# Patient Record
Sex: MALE | URBAN - NONMETROPOLITAN AREA
[De-identification: names, ages, dates, MRNs, and addresses within clinical notes are randomized per-mention and may not be internally consistent; named-entity substitution may affect disease eponyms.]

---

## 2018-12-11 ENCOUNTER — APPOINTMENT (RX ONLY)
Dept: URBAN - NONMETROPOLITAN AREA CLINIC 1 | Facility: CLINIC | Age: 68
Setting detail: DERMATOLOGY
End: 2018-12-11

## 2018-12-11 ENCOUNTER — RX ONLY (OUTPATIENT)
Age: 68
Setting detail: RX ONLY
End: 2018-12-11

## 2018-12-11 DIAGNOSIS — L57.8 OTHER SKIN CHANGES DUE TO CHRONIC EXPOSURE TO NONIONIZING RADIATION: ICD-10-CM

## 2018-12-11 DIAGNOSIS — S90.1 CONTUSION OF TOE WITHOUT DAMAGE TO NAIL: ICD-10-CM

## 2018-12-11 DIAGNOSIS — L91.8 OTHER HYPERTROPHIC DISORDERS OF THE SKIN: ICD-10-CM

## 2018-12-11 DIAGNOSIS — D22 MELANOCYTIC NEVI: ICD-10-CM

## 2018-12-11 PROBLEM — L57.0 ACTINIC KERATOSIS: Status: ACTIVE | Noted: 2018-12-11

## 2018-12-11 PROBLEM — L20.84 INTRINSIC (ALLERGIC) ECZEMA: Status: ACTIVE | Noted: 2018-12-11

## 2018-12-11 PROBLEM — D22.9 MELANOCYTIC NEVI, UNSPECIFIED: Status: ACTIVE | Noted: 2018-12-11

## 2018-12-11 PROBLEM — S90.121A CONTUSION OF RIGHT LESSER TOE(S) WITHOUT DAMAGE TO NAIL, INITIAL ENCOUNTER: Status: ACTIVE | Noted: 2018-12-11

## 2018-12-11 PROCEDURE — 99213 OFFICE O/P EST LOW 20 MIN: CPT

## 2018-12-11 PROCEDURE — ? COUNSELING

## 2018-12-11 RX ORDER — FLUOROURACIL 2 G/40G
CREAM TOPICAL
Qty: 1 | Refills: 2 | Status: ERX | COMMUNITY
Start: 2018-12-11

## 2018-12-11 ASSESSMENT — LOCATION ZONE DERM
LOCATION ZONE: FACE
LOCATION ZONE: TOENAIL
LOCATION ZONE: FACE

## 2018-12-11 ASSESSMENT — LOCATION SIMPLE DESCRIPTION DERM
LOCATION SIMPLE: RIGHT FOREHEAD
LOCATION SIMPLE: RIGHT 2ND TOE
LOCATION SIMPLE: RIGHT CHEEK

## 2018-12-11 ASSESSMENT — LOCATION DETAILED DESCRIPTION DERM
LOCATION DETAILED: RIGHT 2ND TOENAIL
LOCATION DETAILED: RIGHT SUPERIOR LATERAL MALAR CHEEK
LOCATION DETAILED: RIGHT INFERIOR MEDIAL FOREHEAD

## 2021-01-06 DIAGNOSIS — Z23 NEED FOR VACCINATION: ICD-10-CM

## 2021-09-07 ENCOUNTER — APPOINTMENT (RX ONLY)
Dept: URBAN - NONMETROPOLITAN AREA CLINIC 1 | Facility: CLINIC | Age: 71
Setting detail: DERMATOLOGY
End: 2021-09-07

## 2021-09-07 DIAGNOSIS — D485 NEOPLASM OF UNCERTAIN BEHAVIOR OF SKIN: ICD-10-CM

## 2021-09-07 DIAGNOSIS — L81.4 OTHER MELANIN HYPERPIGMENTATION: ICD-10-CM

## 2021-09-07 DIAGNOSIS — L82.0 INFLAMED SEBORRHEIC KERATOSIS: ICD-10-CM

## 2021-09-07 PROBLEM — D48.5 NEOPLASM OF UNCERTAIN BEHAVIOR OF SKIN: Status: ACTIVE | Noted: 2021-09-07

## 2021-09-07 PROCEDURE — 11103 TANGNTL BX SKIN EA SEP/ADDL: CPT

## 2021-09-07 PROCEDURE — ? PRESCRIPTION

## 2021-09-07 PROCEDURE — 99212 OFFICE O/P EST SF 10 MIN: CPT | Mod: 25

## 2021-09-07 PROCEDURE — ? COUNSELING

## 2021-09-07 PROCEDURE — 11102 TANGNTL BX SKIN SINGLE LES: CPT

## 2021-09-07 PROCEDURE — ? BIOPSY BY SHAVE METHOD

## 2021-09-07 RX ORDER — HYDROQUINONE 4 %
1 CREAM (GRAM) TOPICAL BID
Qty: 1 | Refills: 3 | Status: ERX | COMMUNITY
Start: 2021-09-07

## 2021-09-07 RX ADMIN — Medication 1: at 00:00

## 2021-09-07 ASSESSMENT — LOCATION DETAILED DESCRIPTION DERM
LOCATION DETAILED: RIGHT INFERIOR LATERAL MIDBACK
LOCATION DETAILED: RIGHT LATERAL SUPERIOR CHEST
LOCATION DETAILED: RIGHT CENTRAL MALAR CHEEK
LOCATION DETAILED: LEFT CENTRAL MALAR CHEEK

## 2021-09-07 ASSESSMENT — LOCATION SIMPLE DESCRIPTION DERM
LOCATION SIMPLE: RIGHT CHEEK
LOCATION SIMPLE: CHEST
LOCATION SIMPLE: RIGHT LOWER BACK
LOCATION SIMPLE: LEFT CHEEK

## 2021-09-07 ASSESSMENT — LOCATION ZONE DERM
LOCATION ZONE: FACE
LOCATION ZONE: TRUNK

## 2021-09-07 NOTE — PROCEDURE: BIOPSY BY SHAVE METHOD
Detail Level: Detailed
Depth Of Biopsy: dermis
Was A Bandage Applied: Yes
Size Of Lesion In Cm: 0.7
X Size Of Lesion In Cm: 0.8
Biopsy Type: H and E
Biopsy Method: Dermablade
Anesthesia Type: 1% lidocaine with epinephrine and a 1:10 solution of 8.4% sodium bicarbonate
Anesthesia Volume In Cc: 1
Additional Anesthesia Volume In Cc (Will Not Render If 0): 0
Hemostasis: Drysol
Wound Care: Vaseline
Dressing: Band-Aid
Destruction After The Procedure: No
Type Of Destruction Used: Electrodesiccation and Curettage
Curettage Text: The wound bed was treated with curettage after the biopsy was done.
Cryotherapy Text: The wound bed was treated with cryotherapy after the biopsy was performed.
Electrodesiccation Text: The wound bed was treated with electrodesiccation after the biopsy was performed.
Electrodesiccation And Curettage Text: The wound bed was treated with electrodesiccation and curettage after the biopsy was performed.
Silver Nitrate Text: The wound bed was treated with silver nitrate after the biopsy was performed.
Lab: [id number]
Lab Facility: [id number]
Consent: Written consent was obtained and risks were reviewed including but not limited to scarring, infection, bleeding, scabbing, incomplete removal, nerve damage and allergy to anesthesia.
Post-Care Instructions: I reviewed with the patient in detail post-care instructions. Patient is to keep the biopsy site dry overnight, and then apply Polysporin twice daily until healed. Patient may apply hydrogen peroxide soaks to remove any crusting.
Notification Instructions: Patient will be notified of biopsy results. However, patient instructed to call the office if not contacted within 2 weeks.
Billing Type: Third-Party Bill
Information: Selecting Yes will display possible errors in your note based on the variables you have selected. This validation is only offered as a suggestion for you. PLEASE NOTE THAT THE VALIDATION TEXT WILL BE REMOVED WHEN YOU FINALIZE YOUR NOTE. IF YOU WANT TO FAX A PRELIMINARY NOTE YOU WILL NEED TO TOGGLE THIS TO 'NO' IF YOU DO NOT WANT IT IN YOUR FAXED NOTE.
Size Of Lesion In Cm: 0.3

## 2022-02-12 ENCOUNTER — TELEPHONE (OUTPATIENT)
Dept: CARDIOLOGY | Facility: MEDICAL CENTER | Age: 72
End: 2022-02-12

## 2022-02-12 ENCOUNTER — HOSPITAL ENCOUNTER (INPATIENT)
Facility: MEDICAL CENTER | Age: 72
LOS: 2 days | DRG: 247 | End: 2022-02-14
Attending: EMERGENCY MEDICINE | Admitting: INTERNAL MEDICINE
Payer: MEDICARE

## 2022-02-12 ENCOUNTER — APPOINTMENT (OUTPATIENT)
Dept: RADIOLOGY | Facility: MEDICAL CENTER | Age: 72
DRG: 247 | End: 2022-02-12
Attending: EMERGENCY MEDICINE
Payer: MEDICARE

## 2022-02-12 DIAGNOSIS — I21.4 NSTEMI (NON-ST ELEVATED MYOCARDIAL INFARCTION) (HCC): ICD-10-CM

## 2022-02-12 PROBLEM — E53.8 B12 DEFICIENCY: Status: ACTIVE | Noted: 2022-02-12

## 2022-02-12 PROBLEM — N40.0 BPH (BENIGN PROSTATIC HYPERPLASIA): Status: ACTIVE | Noted: 2022-02-12

## 2022-02-12 LAB
ALBUMIN SERPL BCP-MCNC: 4.7 G/DL (ref 3.2–4.9)
ALBUMIN/GLOB SERPL: 1.7 G/DL
ALP SERPL-CCNC: 98 U/L (ref 30–99)
ALT SERPL-CCNC: 16 U/L (ref 2–50)
ANION GAP SERPL CALC-SCNC: 13 MMOL/L (ref 7–16)
AST SERPL-CCNC: 25 U/L (ref 12–45)
BASOPHILS # BLD AUTO: 0.4 % (ref 0–1.8)
BASOPHILS # BLD: 0.02 K/UL (ref 0–0.12)
BILIRUB SERPL-MCNC: 0.5 MG/DL (ref 0.1–1.5)
BUN SERPL-MCNC: 18 MG/DL (ref 8–22)
CALCIUM SERPL-MCNC: 9.6 MG/DL (ref 8.5–10.5)
CHLORIDE SERPL-SCNC: 106 MMOL/L (ref 96–112)
CHOLEST SERPL-MCNC: 149 MG/DL (ref 100–199)
CO2 SERPL-SCNC: 23 MMOL/L (ref 20–33)
CREAT SERPL-MCNC: 0.96 MG/DL (ref 0.5–1.4)
EKG IMPRESSION: NORMAL
EOSINOPHIL # BLD AUTO: 0.03 K/UL (ref 0–0.51)
EOSINOPHIL NFR BLD: 0.6 % (ref 0–6.9)
ERYTHROCYTE [DISTWIDTH] IN BLOOD BY AUTOMATED COUNT: 45.3 FL (ref 35.9–50)
EST. AVERAGE GLUCOSE BLD GHB EST-MCNC: 120 MG/DL
GLOBULIN SER CALC-MCNC: 2.7 G/DL (ref 1.9–3.5)
GLUCOSE SERPL-MCNC: 101 MG/DL (ref 65–99)
HBA1C MFR BLD: 5.8 % (ref 4–5.6)
HCT VFR BLD AUTO: 44.9 % (ref 42–52)
HDLC SERPL-MCNC: 46 MG/DL
HGB BLD-MCNC: 15.2 G/DL (ref 14–18)
IMM GRANULOCYTES # BLD AUTO: 0.01 K/UL (ref 0–0.11)
IMM GRANULOCYTES NFR BLD AUTO: 0.2 % (ref 0–0.9)
LDLC SERPL CALC-MCNC: 86 MG/DL
LYMPHOCYTES # BLD AUTO: 1.26 K/UL (ref 1–4.8)
LYMPHOCYTES NFR BLD: 24.6 % (ref 22–41)
MCH RBC QN AUTO: 31.5 PG (ref 27–33)
MCHC RBC AUTO-ENTMCNC: 33.9 G/DL (ref 33.7–35.3)
MCV RBC AUTO: 93 FL (ref 81.4–97.8)
MONOCYTES # BLD AUTO: 0.48 K/UL (ref 0–0.85)
MONOCYTES NFR BLD AUTO: 9.4 % (ref 0–13.4)
NEUTROPHILS # BLD AUTO: 3.32 K/UL (ref 1.82–7.42)
NEUTROPHILS NFR BLD: 64.8 % (ref 44–72)
NRBC # BLD AUTO: 0 K/UL
NRBC BLD-RTO: 0 /100 WBC
PLATELET # BLD AUTO: 201 K/UL (ref 164–446)
PMV BLD AUTO: 9.4 FL (ref 9–12.9)
POTASSIUM SERPL-SCNC: 4.3 MMOL/L (ref 3.6–5.5)
PROT SERPL-MCNC: 7.4 G/DL (ref 6–8.2)
RBC # BLD AUTO: 4.83 M/UL (ref 4.7–6.1)
SODIUM SERPL-SCNC: 142 MMOL/L (ref 135–145)
TRIGL SERPL-MCNC: 86 MG/DL (ref 0–149)
TROPONIN T SERPL-MCNC: 64 NG/L (ref 6–19)
TROPONIN T SERPL-MCNC: 72 NG/L (ref 6–19)
WBC # BLD AUTO: 5.1 K/UL (ref 4.8–10.8)

## 2022-02-12 PROCEDURE — 99223 1ST HOSP IP/OBS HIGH 75: CPT | Performed by: INTERNAL MEDICINE

## 2022-02-12 PROCEDURE — 83036 HEMOGLOBIN GLYCOSYLATED A1C: CPT

## 2022-02-12 PROCEDURE — A9270 NON-COVERED ITEM OR SERVICE: HCPCS | Performed by: STUDENT IN AN ORGANIZED HEALTH CARE EDUCATION/TRAINING PROGRAM

## 2022-02-12 PROCEDURE — 85025 COMPLETE CBC W/AUTO DIFF WBC: CPT

## 2022-02-12 PROCEDURE — 96372 THER/PROPH/DIAG INJ SC/IM: CPT

## 2022-02-12 PROCEDURE — 700111 HCHG RX REV CODE 636 W/ 250 OVERRIDE (IP): Performed by: STUDENT IN AN ORGANIZED HEALTH CARE EDUCATION/TRAINING PROGRAM

## 2022-02-12 PROCEDURE — 80061 LIPID PANEL: CPT

## 2022-02-12 PROCEDURE — 93005 ELECTROCARDIOGRAM TRACING: CPT | Performed by: EMERGENCY MEDICINE

## 2022-02-12 PROCEDURE — 770020 HCHG ROOM/CARE - TELE (206)

## 2022-02-12 PROCEDURE — 84484 ASSAY OF TROPONIN QUANT: CPT

## 2022-02-12 PROCEDURE — 93005 ELECTROCARDIOGRAM TRACING: CPT

## 2022-02-12 PROCEDURE — 71045 X-RAY EXAM CHEST 1 VIEW: CPT

## 2022-02-12 PROCEDURE — 700102 HCHG RX REV CODE 250 W/ 637 OVERRIDE(OP): Performed by: STUDENT IN AN ORGANIZED HEALTH CARE EDUCATION/TRAINING PROGRAM

## 2022-02-12 PROCEDURE — 99223 1ST HOSP IP/OBS HIGH 75: CPT | Mod: AI | Performed by: STUDENT IN AN ORGANIZED HEALTH CARE EDUCATION/TRAINING PROGRAM

## 2022-02-12 PROCEDURE — 99291 CRITICAL CARE FIRST HOUR: CPT

## 2022-02-12 PROCEDURE — 80053 COMPREHEN METABOLIC PANEL: CPT

## 2022-02-12 RX ORDER — TAMSULOSIN HYDROCHLORIDE 0.4 MG/1
0.4 CAPSULE ORAL DAILY
Status: DISCONTINUED | OUTPATIENT
Start: 2022-02-12 | End: 2022-02-14 | Stop reason: HOSPADM

## 2022-02-12 RX ORDER — TAMSULOSIN HYDROCHLORIDE 0.4 MG/1
0.4 CAPSULE ORAL DAILY
COMMUNITY

## 2022-02-12 RX ORDER — CHOLECALCIFEROL (VITAMIN D3) 125 MCG
1000 CAPSULE ORAL 2 TIMES DAILY
Status: DISCONTINUED | OUTPATIENT
Start: 2022-02-12 | End: 2022-02-14 | Stop reason: HOSPADM

## 2022-02-12 RX ORDER — HEPARIN SODIUM 5000 [USP'U]/ML
5000 INJECTION, SOLUTION INTRAVENOUS; SUBCUTANEOUS EVERY 8 HOURS
Status: DISCONTINUED | OUTPATIENT
Start: 2022-02-12 | End: 2022-02-14 | Stop reason: HOSPADM

## 2022-02-12 RX ORDER — LANOLIN ALCOHOL/MO/W.PET/CERES
1000 CREAM (GRAM) TOPICAL 2 TIMES DAILY
COMMUNITY

## 2022-02-12 RX ADMIN — HEPARIN SODIUM 5000 UNITS: 5000 INJECTION, SOLUTION INTRAVENOUS; SUBCUTANEOUS at 21:49

## 2022-02-12 RX ADMIN — TAMSULOSIN HYDROCHLORIDE 0.4 MG: 0.4 CAPSULE ORAL at 21:49

## 2022-02-12 RX ADMIN — CYANOCOBALAMIN TAB 500 MCG 1000 MCG: 500 TAB at 21:49

## 2022-02-12 ASSESSMENT — ENCOUNTER SYMPTOMS
PALPITATIONS: 0
EYE PAIN: 0
CHILLS: 0
EYE DISCHARGE: 0
FEVER: 0
NERVOUS/ANXIOUS: 0
VOMITING: 0
MYALGIAS: 0
NAUSEA: 0
PSYCHIATRIC NEGATIVE: 1
MUSCULOSKELETAL NEGATIVE: 1
NAUSEA: 1
RESPIRATORY NEGATIVE: 1
EYES NEGATIVE: 1
BRUISES/BLEEDS EASILY: 0
ABDOMINAL PAIN: 0
WHEEZING: 0
NEUROLOGICAL NEGATIVE: 1
COUGH: 0

## 2022-02-12 ASSESSMENT — LIFESTYLE VARIABLES: DO YOU DRINK ALCOHOL: NO

## 2022-02-13 ENCOUNTER — APPOINTMENT (OUTPATIENT)
Dept: CARDIOLOGY | Facility: MEDICAL CENTER | Age: 72
DRG: 247 | End: 2022-02-13
Attending: STUDENT IN AN ORGANIZED HEALTH CARE EDUCATION/TRAINING PROGRAM
Payer: MEDICARE

## 2022-02-13 ENCOUNTER — APPOINTMENT (OUTPATIENT)
Dept: CARDIOLOGY | Facility: MEDICAL CENTER | Age: 72
DRG: 247 | End: 2022-02-13
Attending: INTERNAL MEDICINE
Payer: MEDICARE

## 2022-02-13 LAB
ACT BLD: 231 SEC (ref 74–137)
ACT BLD: 386 SEC (ref 74–137)
LV EJECT FRACT  99904: 70
LV EJECT FRACT MOD 2C 99903: 78.15
LV EJECT FRACT MOD 4C 99902: 68.65
LV EJECT FRACT MOD BP 99901: 73.95
TROPONIN T SERPL-MCNC: 52 NG/L (ref 6–19)

## 2022-02-13 PROCEDURE — 99153 MOD SED SAME PHYS/QHP EA: CPT

## 2022-02-13 PROCEDURE — 93005 ELECTROCARDIOGRAM TRACING: CPT | Performed by: INTERNAL MEDICINE

## 2022-02-13 PROCEDURE — B240ZZ3 ULTRASONOGRAPHY OF SINGLE CORONARY ARTERY, INTRAVASCULAR: ICD-10-PCS | Performed by: INTERNAL MEDICINE

## 2022-02-13 PROCEDURE — 700111 HCHG RX REV CODE 636 W/ 250 OVERRIDE (IP)

## 2022-02-13 PROCEDURE — 93571 IV DOP VEL&/PRESS C FLO 1ST: CPT | Mod: 26,52,LD | Performed by: INTERNAL MEDICINE

## 2022-02-13 PROCEDURE — 700102 HCHG RX REV CODE 250 W/ 637 OVERRIDE(OP): Performed by: STUDENT IN AN ORGANIZED HEALTH CARE EDUCATION/TRAINING PROGRAM

## 2022-02-13 PROCEDURE — 160002 HCHG RECOVERY MINUTES (STAT)

## 2022-02-13 PROCEDURE — 700102 HCHG RX REV CODE 250 W/ 637 OVERRIDE(OP): Performed by: INTERNAL MEDICINE

## 2022-02-13 PROCEDURE — 4A023N7 MEASUREMENT OF CARDIAC SAMPLING AND PRESSURE, LEFT HEART, PERCUTANEOUS APPROACH: ICD-10-PCS | Performed by: INTERNAL MEDICINE

## 2022-02-13 PROCEDURE — 160035 HCHG PACU - 1ST 60 MINS PHASE I

## 2022-02-13 PROCEDURE — B2111ZZ FLUOROSCOPY OF MULTIPLE CORONARY ARTERIES USING LOW OSMOLAR CONTRAST: ICD-10-PCS | Performed by: INTERNAL MEDICINE

## 2022-02-13 PROCEDURE — 160036 HCHG PACU - EA ADDL 30 MINS PHASE I

## 2022-02-13 PROCEDURE — 700101 HCHG RX REV CODE 250

## 2022-02-13 PROCEDURE — 99152 MOD SED SAME PHYS/QHP 5/>YRS: CPT | Performed by: INTERNAL MEDICINE

## 2022-02-13 PROCEDURE — A9270 NON-COVERED ITEM OR SERVICE: HCPCS | Performed by: INTERNAL MEDICINE

## 2022-02-13 PROCEDURE — 700111 HCHG RX REV CODE 636 W/ 250 OVERRIDE (IP): Performed by: STUDENT IN AN ORGANIZED HEALTH CARE EDUCATION/TRAINING PROGRAM

## 2022-02-13 PROCEDURE — 027034Z DILATION OF CORONARY ARTERY, ONE ARTERY WITH DRUG-ELUTING INTRALUMINAL DEVICE, PERCUTANEOUS APPROACH: ICD-10-PCS | Performed by: INTERNAL MEDICINE

## 2022-02-13 PROCEDURE — 85347 COAGULATION TIME ACTIVATED: CPT | Mod: 91

## 2022-02-13 PROCEDURE — A9270 NON-COVERED ITEM OR SERVICE: HCPCS | Performed by: STUDENT IN AN ORGANIZED HEALTH CARE EDUCATION/TRAINING PROGRAM

## 2022-02-13 PROCEDURE — 36415 COLL VENOUS BLD VENIPUNCTURE: CPT

## 2022-02-13 PROCEDURE — 84484 ASSAY OF TROPONIN QUANT: CPT

## 2022-02-13 PROCEDURE — 96372 THER/PROPH/DIAG INJ SC/IM: CPT

## 2022-02-13 PROCEDURE — 92978 ENDOLUMINL IVUS OCT C 1ST: CPT | Mod: 26,LD | Performed by: INTERNAL MEDICINE

## 2022-02-13 PROCEDURE — 92928 PRQ TCAT PLMT NTRAC ST 1 LES: CPT | Mod: LD | Performed by: INTERNAL MEDICINE

## 2022-02-13 PROCEDURE — 770020 HCHG ROOM/CARE - TELE (206)

## 2022-02-13 PROCEDURE — 700102 HCHG RX REV CODE 250 W/ 637 OVERRIDE(OP)

## 2022-02-13 PROCEDURE — 93306 TTE W/DOPPLER COMPLETE: CPT | Mod: 26 | Performed by: INTERNAL MEDICINE

## 2022-02-13 PROCEDURE — A9270 NON-COVERED ITEM OR SERVICE: HCPCS

## 2022-02-13 PROCEDURE — 93458 L HRT ARTERY/VENTRICLE ANGIO: CPT | Mod: 26,59 | Performed by: INTERNAL MEDICINE

## 2022-02-13 PROCEDURE — 93306 TTE W/DOPPLER COMPLETE: CPT

## 2022-02-13 PROCEDURE — 99232 SBSQ HOSP IP/OBS MODERATE 35: CPT | Performed by: INTERNAL MEDICINE

## 2022-02-13 RX ORDER — PRASUGREL 10 MG/1
TABLET, FILM COATED ORAL
Status: DISPENSED
Start: 2022-02-13 | End: 2022-02-13

## 2022-02-13 RX ORDER — PRASUGREL 10 MG/1
10 TABLET, FILM COATED ORAL DAILY
Status: DISCONTINUED | OUTPATIENT
Start: 2022-02-14 | End: 2022-02-14

## 2022-02-13 RX ORDER — ROSUVASTATIN CALCIUM 10 MG/1
10 TABLET, COATED ORAL EVERY EVENING
Status: DISCONTINUED | OUTPATIENT
Start: 2022-02-13 | End: 2022-02-14

## 2022-02-13 RX ORDER — HEPARIN SODIUM 200 [USP'U]/100ML
INJECTION, SOLUTION INTRAVENOUS
Status: COMPLETED
Start: 2022-02-13 | End: 2022-02-13

## 2022-02-13 RX ORDER — MIDAZOLAM HYDROCHLORIDE 1 MG/ML
INJECTION INTRAMUSCULAR; INTRAVENOUS
Status: COMPLETED
Start: 2022-02-13 | End: 2022-02-13

## 2022-02-13 RX ORDER — SODIUM CHLORIDE 9 MG/ML
INJECTION, SOLUTION INTRAVENOUS CONTINUOUS
Status: DISCONTINUED | OUTPATIENT
Start: 2022-02-13 | End: 2022-02-13

## 2022-02-13 RX ORDER — LIDOCAINE HYDROCHLORIDE 20 MG/ML
INJECTION, SOLUTION INFILTRATION; PERINEURAL
Status: COMPLETED
Start: 2022-02-13 | End: 2022-02-13

## 2022-02-13 RX ORDER — PRASUGREL 10 MG/1
60 TABLET, FILM COATED ORAL ONCE
Status: DISCONTINUED | OUTPATIENT
Start: 2022-02-13 | End: 2022-02-13

## 2022-02-13 RX ORDER — VERAPAMIL HYDROCHLORIDE 2.5 MG/ML
INJECTION, SOLUTION INTRAVENOUS
Status: COMPLETED
Start: 2022-02-13 | End: 2022-02-13

## 2022-02-13 RX ORDER — METOPROLOL SUCCINATE 25 MG/1
25 TABLET, EXTENDED RELEASE ORAL
Status: DISCONTINUED | OUTPATIENT
Start: 2022-02-14 | End: 2022-02-14

## 2022-02-13 RX ORDER — HEPARIN SODIUM 1000 [USP'U]/ML
INJECTION, SOLUTION INTRAVENOUS; SUBCUTANEOUS
Status: COMPLETED
Start: 2022-02-13 | End: 2022-02-13

## 2022-02-13 RX ADMIN — VERAPAMIL HYDROCHLORIDE 5 MG: 2.5 INJECTION, SOLUTION INTRAVENOUS at 09:43

## 2022-02-13 RX ADMIN — FENTANYL CITRATE 50 MCG: 50 INJECTION INTRAMUSCULAR; INTRAVENOUS at 10:11

## 2022-02-13 RX ADMIN — HEPARIN SODIUM 2000 UNITS: 200 INJECTION, SOLUTION INTRAVENOUS at 09:43

## 2022-02-13 RX ADMIN — LIDOCAINE HYDROCHLORIDE: 20 INJECTION, SOLUTION INFILTRATION; PERINEURAL at 09:43

## 2022-02-13 RX ADMIN — MIDAZOLAM HYDROCHLORIDE 1 MG: 1 INJECTION, SOLUTION INTRAMUSCULAR; INTRAVENOUS at 10:11

## 2022-02-13 RX ADMIN — HEPARIN SODIUM 5000 UNITS: 5000 INJECTION, SOLUTION INTRAVENOUS; SUBCUTANEOUS at 21:36

## 2022-02-13 RX ADMIN — HEPARIN SODIUM 5000 UNITS: 5000 INJECTION, SOLUTION INTRAVENOUS; SUBCUTANEOUS at 06:24

## 2022-02-13 RX ADMIN — HEPARIN SODIUM: 1000 INJECTION, SOLUTION INTRAVENOUS; SUBCUTANEOUS at 09:43

## 2022-02-13 RX ADMIN — CYANOCOBALAMIN TAB 500 MCG 1000 MCG: 500 TAB at 17:34

## 2022-02-13 RX ADMIN — TAMSULOSIN HYDROCHLORIDE 0.4 MG: 0.4 CAPSULE ORAL at 17:34

## 2022-02-13 RX ADMIN — ROSUVASTATIN 10 MG: 10 TABLET, FILM COATED ORAL at 17:33

## 2022-02-13 RX ADMIN — ASPIRIN 81 MG: 81 TABLET, COATED ORAL at 06:24

## 2022-02-13 RX ADMIN — NITROGLYCERIN 10 ML: 20 INJECTION INTRAVENOUS at 09:43

## 2022-02-13 ASSESSMENT — COGNITIVE AND FUNCTIONAL STATUS - GENERAL
MOBILITY SCORE: 24
DAILY ACTIVITIY SCORE: 24
SUGGESTED CMS G CODE MODIFIER MOBILITY: CH
SUGGESTED CMS G CODE MODIFIER DAILY ACTIVITY: CH

## 2022-02-13 ASSESSMENT — ENCOUNTER SYMPTOMS
DIZZINESS: 0
CONSTIPATION: 0
FEVER: 0
VOMITING: 0
DIARRHEA: 0
SORE THROAT: 0
COUGH: 0
BACK PAIN: 0
PALPITATIONS: 0
CHILLS: 0
NAUSEA: 0
NERVOUS/ANXIOUS: 0
ABDOMINAL PAIN: 0
HEADACHES: 0
SHORTNESS OF BREATH: 0

## 2022-02-13 ASSESSMENT — LIFESTYLE VARIABLES
CONSUMPTION TOTAL: INCOMPLETE
TOTAL SCORE: 0
ALCOHOL_USE: YES
HAVE YOU EVER FELT YOU SHOULD CUT DOWN ON YOUR DRINKING: NO
DOES PATIENT WANT TO STOP DRINKING: NO
TOTAL SCORE: 0
EVER HAD A DRINK FIRST THING IN THE MORNING TO STEADY YOUR NERVES TO GET RID OF A HANGOVER: NO
EVER FELT BAD OR GUILTY ABOUT YOUR DRINKING: NO
HAVE PEOPLE ANNOYED YOU BY CRITICIZING YOUR DRINKING: NO
TOTAL SCORE: 0

## 2022-02-13 ASSESSMENT — PATIENT HEALTH QUESTIONNAIRE - PHQ9
1. LITTLE INTEREST OR PLEASURE IN DOING THINGS: NOT AT ALL
SUM OF ALL RESPONSES TO PHQ9 QUESTIONS 1 AND 2: 0
2. FEELING DOWN, DEPRESSED, IRRITABLE, OR HOPELESS: NOT AT ALL

## 2022-02-13 ASSESSMENT — PAIN DESCRIPTION - PAIN TYPE
TYPE: ACUTE PAIN
TYPE: ACUTE PAIN

## 2022-02-13 NOTE — OR NURSING
1026: [facility] Awake. TR band right radial. No swelling, hematoma or bleeding noted. 2 plus radial pulse. Denies pain or shortness of breath    1100: taking fluids well.    1120: 12 Lead EKG completed.    1130: 2 ml air removed from TR band. No hematoma, swelling or bleeding noted. 2 plus radial pulse.    1145: 2 ml air removed from TR band. No hematoma, swelling or bleeding noted. 2 plus radial pulse.    1200: 3 ml air removed from TR band. No hematoma, swelling or bleeding noted. 2 plus radial pulse.    1215:3 ml air removed from TR band. No hematoma, swelling or bleeding noted. 2 plus radial pulse. Patient eating Cardiac diet for lunch.tolerating well.    1230: slight hematoma noted close to the TR band. Pressure applied.    1250: 3 ml air injected back to TR band.    1330: 3 ml air removed from TR band. No swelling or bleeding noted. 2 plus radial pulse.    1345: 3 ml air removed from TR band. No swelling or bleeding noted. 2 plus radial pulse.    1350: TR band removed. No bleeding or swelling noted. right wrist slightly bruised. Gauze and tegaderm dressing applied over the access site. Patient seating at the side of the [place] voiding.    1430: awake. Denies any distress or discomfort.    1445: report called to Nori PATEL.    1449: transported via [facility] to T 826 by this RN. Stable. Patient on cardiac and pulse ox monitor. Patient wearing face mask. Bedside report given to Riaz PATEL.

## 2022-02-13 NOTE — CONSULTS
Cardiology Initial Consultation    Date of Service  2/12/2022    Referring Physician  Favio Ramirez M.D.    Reason for Consultation  Acute coronary syndrome    History of Presenting Illness  John Timothy D R Lombard is a 71 y.o. male with a past medical history of hyperlipidemia who presented 2/12/2022 with chest discomfort while skate skiing today which  lasted a few minutes.  Some diaphoresis and nausea.  No further episodes.  Did have an episode approximately a week ago.  In quite good shape.  Bikes and skis.  Does not smoke minimal alcohol use.  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  On BPH meds.  Intolerant to statins and Zetia.  Did take aspirin today.  Normal echocardiogram in the past.  Previous CT angiogram with an LAD plaque but low coronary calcium score.    Review of Systems  Review of Systems   Constitutional: Negative for chills and fever.   HENT: Negative for congestion.    Eyes: Negative for pain and discharge.   Respiratory: Negative for cough and wheezing.    Cardiovascular: Negative for chest pain and palpitations.   Gastrointestinal: Negative for abdominal pain, nausea and vomiting.   Musculoskeletal: Negative for myalgias.   Skin: Negative for rash.   Hematological: Does not bruise/bleed easily.   Psychiatric/Behavioral: The patient is not nervous/anxious.    All other systems reviewed and are negative.      Past Medical History   has no past medical history on file.    Surgical History   has no past surgical history on file.    Family History  family history is not on file.    Social History   reports that he quit smoking about 42 years ago. He has never used smokeless tobacco. He reports current alcohol use. He reports that he does not use drugs.    Medications  None       Allergies  Allergies   Allergen Reactions   • Repatha [Evolocumab] Swelling     Lip swelling       Vital signs in last 24 hours  Temp:  [36.6 °C (97.9 °F)] 36.6 °C (97.9 °F)  Pulse:  [66-71] 66  Resp:  [14-16] 14  BP:  (117-127)/(70-75) 117/75  SpO2:  [97 %-98 %] 98 %    Physical Exam  Physical Exam  Vitals reviewed.   Constitutional:       General: He is not in acute distress.     Appearance: He is well-developed. He is not diaphoretic.   Eyes:      Conjunctiva/sclera: Conjunctivae normal.   Neck:      Thyroid: No thyroid mass or thyromegaly.      Vascular: No JVD.      Trachea: No tracheal deviation.   Cardiovascular:      Rate and Rhythm: Normal rate and regular rhythm.      Heart sounds: No murmur heard.      Pulmonary:      Effort: Pulmonary effort is normal. No respiratory distress.      Breath sounds: Normal breath sounds.   Chest:      Chest wall: No tenderness.   Abdominal:      Palpations: Abdomen is soft.      Tenderness: There is no abdominal tenderness.   Musculoskeletal:         General: Normal range of motion.   Skin:     General: Skin is warm and dry.   Neurological:      Mental Status: He is alert and oriented to person, place, and time.      Motor: No tremor.   Psychiatric:         Behavior: Behavior normal.         Lab Review  Lab Results   Component Value Date/Time    WBC 5.1 02/12/2022 06:51 PM    RBC 4.83 02/12/2022 06:51 PM    HEMOGLOBIN 15.2 02/12/2022 06:51 PM    HEMATOCRIT 44.9 02/12/2022 06:51 PM    MCV 93.0 02/12/2022 06:51 PM    MCH 31.5 02/12/2022 06:51 PM    MCHC 33.9 02/12/2022 06:51 PM    MPV 9.4 02/12/2022 06:51 PM      Lab Results   Component Value Date/Time    SODIUM 142 02/12/2022 06:51 PM    POTASSIUM 4.3 02/12/2022 06:51 PM    CHLORIDE 106 02/12/2022 06:51 PM    CO2 23 02/12/2022 06:51 PM    GLUCOSE 101 (H) 02/12/2022 06:51 PM    BUN 18 02/12/2022 06:51 PM    CREATININE 0.96 02/12/2022 06:51 PM      Lab Results   Component Value Date/Time    ASTSGOT 25 02/12/2022 06:51 PM    ALTSGPT 16 02/12/2022 06:51 PM     Lab Results   Component Value Date/Time    CHOLSTRLTOT 192 06/21/2011 10:16 AM     06/21/2011 10:16 AM    HDL 43 06/21/2011 10:16 AM    TRIGLYCERIDE 86 06/21/2011 10:16 AM     TROPONINT 72 (H) 02/12/2022 06:51 PM       No results for input(s): NTPROBNP in the last 72 hours.    Cardiac Imaging and Procedures Review  EKG:  My personal interpretation of the EKG dated 2/12/2022 is sinus rhythm with RSR prime in V1.  Possible left atrial enlargement      Cardiac Catheterization: Pending    Imaging  Chest X-Ray: Unremarkable        Assessment/Plan  No new Assessment & Plan notes have been filed under this hospital service since the last note was generated.  Service: Cardiac Electrophysiology  1.  Acute coronary syndrome with chest discomfort x1 with exertion.  Patient deferred serial troponins.  Continue aspirin.  Did take a statin today.  Schedule cardiac catheterization in the morning.  If recurrent chest pain this evening then cath tonight.  2.  BPH.  3.  Hyperlipidemia.  4.  Positive family history of heart.  The risks, benefits, and alternatives to coronary angiography with IV sedation were discussed in great detail. Specific risks mentioned include bleeding, infection, kidney damage, allergic reaction, cardiac perforation with possible tamponade requiring pericardiocentesis or possibly open heart surgery. In addition, we discussed that 10% of patients will experience small to moderate bruising at the site of the arterial puncture. Lastly, the risks of heart attack, stroke and death were discussed; the risk of major complications such as heart attack or stroke caused by the angiogram is approximately 1%; the risk of death is approximately 1 in 1000. The patient verbalized understanding of the potential complications and wishes to proceed with this procedure.    Thank you for allowing me to participate in the care of this patient.      Please contact me with any questions.    William Dent M.D.   Cardiologist, [facility]  [phone]

## 2022-02-13 NOTE — HOSPITAL COURSE
71-year-old male with past medical history of BPH presented 2/12/2022 with chest pain.  Please refer to H&P of Dr. 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      He had previous CT angiogram with an LAD plaque, however with low coronary calcium score. he last had  echocardiogram several years ago, and everything within normal limits.  Never had cardiac catheterization before.  On arrival troponin 72, EKG sinus rhythm without bleeding wave changes.  Chest x-ray no acute pulmonary abnormalities.  Cardiology was consulted.  Patient was taken for cardiac cath by Dr. Palomares. Stent placement

## 2022-02-13 NOTE — ED NOTES
Report received, care of pt taken over, echo complete, pt currently denies any  CP. Await cath lab.

## 2022-02-13 NOTE — PROGRESS NOTES
4 Eyes Skin Assessment Completed by AMANDA Julio and AMANDA Kennedy.    Head WDL  Ears WDL  Nose WDL  Mouth WDL  Neck WDL  Breast/Chest WDL  Shoulder Blades WDL  Spine WDL  (R) Arm/Elbow/Hand WDL  (L) Arm/Elbow/Hand WDL  Abdomen WDL  Groin WDL  Scrotum/Coccyx/Buttocks WDL  (R) Leg WDL  (L) Leg WDL  (R) Heel/Foot/Toe WDL  (L) Heel/Foot/Toe WDL          Devices In Places Blood Pressure Cuff      Interventions In Place Pillows and Pressure Redistribution Mattress    Possible Skin Injury No    Pictures Uploaded Into Epic N/A  Wound Consult Placed N/A  RN Wound Prevention Protocol Ordered No

## 2022-02-13 NOTE — OR NURSING
1400 assumed care of pt. Awake, alert, oriented, playing with personal smart phone. RA. SR. Skin pink warm dry.  DENIES pain, nausea, sob, chest pain. RUE pink warm, brisk cap refill, 2+ rad pulse, denies numbness tingling. RUE no drainage, no new hematoma, dressing cdi.     1419 hand off to Catina PATEL

## 2022-02-13 NOTE — ED NOTES
Med Rec completed per patient   Allergies reviewed  No ORAL antibiotics in last 30 days    Patient reports taking 325 mg of aspirin earlier today.

## 2022-02-13 NOTE — ED PROVIDER NOTES
ED Provider Note    Scribed for Favio Ramesh by Priya Tyler. 2022  7:52 PM    Primary care provider: Pcp Unknown  Means of arrival: Walk in   History obtained from: Patient  History limited by: None    CHIEF COMPLAINT  Chief Complaint   Patient presents with   • Chest Pain     Sudden onset L sided chest pressure while cross-country skiing, lasted 10 minutes, resolved w/ rest, along w/ symptoms of light headedness, nausea, weakness, diaphoresis. All resolved now.        HPI  John Timothy D R Lombard is a 71 y.o. male who presents to the Emergency Department for evaluation of chest pressure onset earlier today. The patient reports that he started to experience sudden onset right sided chest pressure while cross country skiing. He notes that the episode lasted 10 minutes. Following the episode, he admits to associated symptoms of nausea, weakness, and diaphoresis, but denies shortness of breath. No alleviating factors were reported. He was prompted to come to the ED tonight after he speaking to a cardiologist here in [place]. The patient reports that he had a similar episode of chest pressure with exertion last week. He notes that the chest pain has not returned since his episode today. Arias took Aspirin. He has a history of cardiac workups and had a CT angio about 5 years ago. The patient does not drink alcohol or use illicit drugs. He is vaccinated for COVID.       Quality: Tight  Duration: 10 minutes  Severity: Mild  Associated sx: Nausea    REVIEW OF SYSTEMS  As above, all other systems reviewed and are negative.   Pertinent positives include chest pressure, nausea, weakness, and diaphoresis. Pertinent negatives include no shortness of breath. See HPI for further details.     PAST MEDICAL HISTORY  None noted.     SURGICAL HISTORY  patient denies any surgical history  SOCIAL HISTORY  Social History     Tobacco Use   • Smoking status: Former Smoker     Quit date: 1980     Years since quittin.0  "  • Smokeless tobacco: Never Used   Substance Use Topics   • Alcohol use: Yes     Comment: rarely    • Drug use: Never      Social History     Substance and Sexual Activity   Drug Use Never     FAMILY HISTORY  History reviewed. No pertinent family history.  CURRENT MEDICATIONS  Home Medications     Reviewed by Jonny Schrader (Pharmacy Tech) on 02/12/22 at 2037  Med List Status: Complete   Medication Last Dose Status   aspirin EC (ECOTRIN) 81 MG Tablet Delayed Response 2/12/2022 Active   Cyanocobalamin (VITAMIN B-12) 1000 MCG Tab 2/12/2022 Active   tamsulosin (FLOMAX) 0.4 MG capsule 2/11/2022 Active              ALLERGIES  Allergies   Allergen Reactions   • Repatha [Evolocumab] Swelling     Lip swelling       PHYSICAL EXAM    VITAL SIGNS:   Vitals:    02/12/22 1813 02/12/22 1822 02/12/22 1932 02/12/22 2000   BP: 127/70  117/75 154/87   Pulse: 71  66 68   Resp: 16  14 18   Temp: 36.6 °C (97.9 °F)      TempSrc: Temporal      SpO2: 97%  98% 97%   Weight:  83.7 kg (184 lb 8.4 oz)     Height: 1.803 m (5' 11\") 1.803 m (5' 11\")         Vitals: My interpretation: normotensive , not tachycardic, afebrile, not hypoxic    Reinterpretation of vitals: Unchanged    Cardiac Monitor Interpretation: The cardiac monitor revealed normal Sinus Rhythm as interpreted by me. The cardiac monitor was ordered secondary to the patient's history of NSTEMI, chest pain and to monitor for dysrhythmia and/or tachycardia.    PE:   Gen: sitting comfortably, speaking clearly, appears in no acute distress   ENT: Mucous membranes moist, posterior pharynx clear, uvula midline, nares patent bilaterally   Neck: Supple, FROM  Pulmonary: Lungs are clear to auscultation bilaterally. No tachypnea  CV:  RRR, no murmur appreciated, pulses 2+ in both upper and lower extremities  Abdomen: soft, NT/ND; no rebound/guarding  : no CVA or suprapubic tenderness   Neuro: A&Ox4 (person, place, time, situation), speech fluent, gait steady, no focal deficits " appreciated  Skin: No rash or lesions.  No pallor or jaundice.  No cyanosis.  Warm and dry.     DIAGNOSTIC STUDIES / PROCEDURES    LABS  Results for orders placed or performed during the hospital encounter of 02/12/22   CBC with Differential   Result Value Ref Range    WBC 5.1 4.8 - 10.8 K/uL    RBC 4.83 4.70 - 6.10 M/uL    Hemoglobin 15.2 14.0 - 18.0 g/dL    Hematocrit 44.9 42.0 - 52.0 %    MCV 93.0 81.4 - 97.8 fL    MCH 31.5 27.0 - 33.0 pg    MCHC 33.9 33.7 - 35.3 g/dL    RDW 45.3 35.9 - 50.0 fL    Platelet Count 201 164 - 446 K/uL    MPV 9.4 9.0 - 12.9 fL    Neutrophils-Polys 64.80 44.00 - 72.00 %    Lymphocytes 24.60 22.00 - 41.00 %    Monocytes 9.40 0.00 - 13.40 %    Eosinophils 0.60 0.00 - 6.90 %    Basophils 0.40 0.00 - 1.80 %    Immature Granulocytes 0.20 0.00 - 0.90 %    Nucleated RBC 0.00 /100 WBC    Neutrophils (Absolute) 3.32 1.82 - 7.42 K/uL    Lymphs (Absolute) 1.26 1.00 - 4.80 K/uL    Monos (Absolute) 0.48 0.00 - 0.85 K/uL    Eos (Absolute) 0.03 0.00 - 0.51 K/uL    Baso (Absolute) 0.02 0.00 - 0.12 K/uL    Immature Granulocytes (abs) 0.01 0.00 - 0.11 K/uL    NRBC (Absolute) 0.00 K/uL   Complete Metabolic Panel (CMP)   Result Value Ref Range    Sodium 142 135 - 145 mmol/L    Potassium 4.3 3.6 - 5.5 mmol/L    Chloride 106 96 - 112 mmol/L    Co2 23 20 - 33 mmol/L    Anion Gap 13.0 7.0 - 16.0    Glucose 101 (H) 65 - 99 mg/dL    Bun 18 8 - 22 mg/dL    Creatinine 0.96 0.50 - 1.40 mg/dL    Calcium 9.6 8.5 - 10.5 mg/dL    AST(SGOT) 25 12 - 45 U/L    ALT(SGPT) 16 2 - 50 U/L    Alkaline Phosphatase 98 30 - 99 U/L    Total Bilirubin 0.5 0.1 - 1.5 mg/dL    Albumin 4.7 3.2 - 4.9 g/dL    Total Protein 7.4 6.0 - 8.2 g/dL    Globulin 2.7 1.9 - 3.5 g/dL    A-G Ratio 1.7 g/dL   Troponin   Result Value Ref Range    Troponin T 72 (H) 6 - 19 ng/L   ESTIMATED GFR   Result Value Ref Range    GFR If African American >60 >60 mL/min/1.73 m 2    GFR If Non African American >60 >60 mL/min/1.73 m 2   HEMOGLOBIN A1C   Result Value  Ref Range    Glycohemoglobin 5.8 (H) 4.0 - 5.6 %    Est Avg Glucose 120 mg/dL   EKG (NOW)   Result Value Ref Range    Report       [facility] Emergency Dept.    Test Date:  2022  Pt Name:    JOHN LOMBARD                 Department: ER  MRN:        [id number]                      Room:  Gender:     Male                         Technician: [id number]  :        1950                   Requested By:ER TRIAGE PROTOCOL  Order #:    [id number]                    Reading MD: Favio Ramesh    Measurements  Intervals                                Axis  Rate:       68                           P:          68  CT:         164                          QRS:        -12  QRSD:       106                          T:          20  QT:         404  QTc:        430    Interpretive Statements  SINUS RHYTHM  PROBABLE LEFT ATRIAL ABNORMALITY  INCOMPLETE RIGHT BUNDLE BRANCH BLOCK  No previous ECG available for comparison  Electronically Signed On 2022 19:52:08 PST by Favio Ramesh        All labs reviewed by me. Significant for no leukocytosis, no anemia, no thrombocytopenia, normal electrolytes, normal renal function, normal liver enzymes, normal bilirubin, slight elevated troponin at 72    RADIOLOGY  DX-CHEST-PORTABLE (1 VIEW)   Final Result         1. No acute cardiopulmonary abnormalities are identified.      CL-LEFT HEART CATHETERIZATION WITH POSSIBLE INTERVENTION    (Results Pending)   EC-ECHOCARDIOGRAM COMPLETE W/O CONT    (Results Pending)     The radiologist's interpretation of all radiological studies have been reviewed by me.    COURSE & MEDICAL DECISION MAKING  Nursing notes, VS, PMSFHx, labs, imaging, EKG reviewed in chart.    Heart Score: Moderate    MDM: 7:52 PM John Timothy D R Lombard is a 71 y.o. male who presented with episodic chest pain.  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  Today he was cross-country skiing had another episode of exertional  chest pain that last about 10 minutes.  He did troponin EKG at his clinic in [place], EKG was normal he stated but troponin was elevated to 60.  He called cardiology who recommended coming to emerge department for evaluation.  Today his EKG here is without ischemia, chest x-ray unremarkable, CBC and CMP normal.  His troponin is 79.  Discussed with Dr. Dent cardiology recommends inpatient admission which was done via the hospitalist, plan to catheterize in the morning.  Dr. Dent recommends no heparin drip at this time.  Patient resting comfortably, no acute distress verbalized understanding plan for admission.    8:15 PM - Spoke with Dr. Dent. He recommends admission and catheterization in the morning.      FINAL IMPRESSION  1. NSTEMI (non-ST elevated myocardial infarction) (HCC) Acute       Priya JOHNSON (Scribe), am scribing for, and in the presence of, Favio Ramesh.    Electronically signed by: Priya Tyler (Diamante), 2/12/2022    IFavio personally performed the services described in this documentation, as scribed by Priya Tyler in my presence, and it is both accurate and complete.    The note accurately reflects work and decisions made by me.  Favio Ramesh  2/12/2022  9:16 PM

## 2022-02-13 NOTE — ED TRIAGE NOTES
Chief Complaint   Patient presents with   • Chest Pain     Sudden onset L sided chest pressure while cross-country skiing, lasted 10 minutes, resolved w/ rest, along w/ symptoms of light headedness, nausea, weakness, diaphoresis. All resolved now.      Pt ambulatory to triage for above complaint. He was exercising today when he had a bout of chest pressure lasting 10 minutes that resolved w/ rest. 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 Pt also took aspirin and prestor.

## 2022-02-13 NOTE — PROCEDURES
Cardiac Catheterization and Percutaneous Intervention Procedure Report    2/13/2022    Referring MD:     Indication for procedure: ACS >24 hours    Procedures:  · Insertion of 5/6 FR sheath in the right radial artery  · right and left coronary arteriograms  · Left heart catheterization  · IFR of LAD  · IVUS guided angioplasty and placement of a 3.5 by 22mm [place] drug-eluting stent in proximal  left anterior descending coronary artery.  ·   Final diagnosis:   single vessel coronary artery disease.  Successful percutaneous intervention of left anterior descending coronary artery.    Recommendations: Guideline directed medical therapy and risk factor management      Coronary arteriograms:  Left main: mild plaque about 10% distal left main disease.  Left anterior descending: Ostial LAD is about 20 to 30% stenosis.,  Very proximal LAD has 70% stenosis, IFR across this lesion is 0.89.  Large type III LAD, gives several small diagonal branches in mid to distal portion  Left circumflex: normal. four major marginal branches are noted. No significant disease is noted in marginal branches.  Right coronary: normal, dominant    Left Heart Catheterization:  Left Ventriculogram: Not performed  Left Ventricular EDP:  17 mm Hg   Aortic Valve Gradient: No significant AV gradient noted    Procedure details:  Arias Staples EUGENE GarciaLombard was brought to the cardiac catheterization lab where the right wrist was prepped and draped in the usual manner for cardiac catheterization.  Timeout was performed.  The area was anesthetized with lidocaine and a 5/6 FR sheath was inserted into the right radial artery without difficulty. A #3.5 left Elizabeth catheter was advanced to the ostia of the Left coronary artery and arteriograms were recorded.   A #4 right Elizabeth catheter was advanced to the ostia of the right coronary artery and arteriograms were recorded. Aortic valve was crossed using #4 right Elizabeth catheter left heart catheterization  "was performed.  Patient underwent percutaneous coronary revascularization as outlined below.  At the completion of the case the sheath was removed and hemostasis achieved utilizing a radial compression band .  Patient was pain-free and hemodynamically stable at the completion of the case.  There were no apparent complications.    Interventional Procedure LAD:     Given the patient's clinical presentation and coronary anatomy, PCI was indicated and we proceeded with the intervention as detailed below.    Indication for PCI:  NSTE- ACD    Pre: 70 %, 18 mm length, BERTRAM 3 flow  Post: 0%, BERTRAM 3 flow    Lesion complexity  Non-High  Severe calcification No  Bifurcation  No    Guide catheter: EBU 3.5 was advanced to the ostia of the left coronary artery.    Guide wire: A 0.014\" mm  Verrata was advanced into the artery and crossed the lesion. IFR measured at 0.89.  Balloon pre-dilatation: 3.0 by 15 mm Emerge inflated to 10 ASHKAN to pre-dilate the lesion.    Stent: A 3.5 by 22 mm [place] drug-eluting  stent was deployed in proximal  left anterior descending coronary artery at 12 ASHKAN.    Intravascular ultrasound performed shows vessel diameter mid plaque to mid plaque about 4.2 mm in proximal portion, 4 mm in distal portion.  Ostial LAD has moderate disease but no dissection, minimal luminal area of ostial LAD is over 7.5 mm, which is mild disease, not hemodynamically significant by IVUS criteria    Stent postdilated to 4 mm distally, 4.2 mm proximally using 4.0 x 15 mm noncompliant balloon.    At the end excellent flow into distal LAD, ostial/very proximal LAD has residual, mild 20 to 30% disease.    Anticoagulant: Heparin  Antiplatelet: Prasugrel  EBL <25 cc  Complications: none  Specimens: none  Contrast: 60cc  Fluorotime : see cath lab flowsheet      Sedation: I supervised moderate sedation over a trained independent observer.    Sedation start time: 09:27  End time: 0:12      Electronically signed by   Kumar Mock " JONATHAN BOYD  Interventional cardiologist  2/13/2022  10:26 AM

## 2022-02-13 NOTE — TELEPHONE ENCOUNTER
Dr. Lombard called me regarding an episode of chest pain while skate skiing today.  [facility].  History of hyperlipidemia.  Some LAD disease on coronary CT. He wished to be admitted directly.  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  I called the bed control.  No ICU or telemetry beds are available.  I asked Dr. Lombard to come through the emergency room and we could do cardiac angiography from the ER.

## 2022-02-13 NOTE — H&P
Hospital Medicine History & Physical Note    Date of Service  2/12/2022    Primary Care Physician  Pcp Unknown    Consultants  cardiology    Specialist Names: Dr. Dent    Code Status  Full Code    Chief Complaint  Chief Complaint   Patient presents with   • Chest Pain     Sudden onset L sided chest pressure while cross-country skiing, lasted 10 minutes, resolved w/ rest, along w/ symptoms of light headedness, nausea, weakness, diaphoresis. All resolved now.        History of Presenting Illness  John Timothy D R Lombard is a 71 y.o. male who presented 2/12/2022 with chest pain.  Patient was skiing this afternoon when he suddenly developed chest pain associated with diaphoresis lightheadedness nausea weakness.  He stated that lasted about 10 minutes, once it resolved he was able to ski to the bottom.  Patient is a cardiologist.  He self-administered loading dose of aspirin and took 1 Crestor pill.     He had previous CT angiogram with an LAD plaque, however with low coronary calcium score. he last had  echocardiogram several years ago, and everything within normal limits.  Never had cardiac catheterization before.  He occasionally drinks, denies tobacco and illicit drug use.  His only medication is B12 and Flomax    In the ED, patient found to have normal vital signs. Remarkable labs include sugar 101, troponin 72. CXR showing no acute cardiopulmonary abnormality. EKG showing NSR w/ no T wave changes to suggest ischemia.     I discussed the plan of care with patient.    Review of Systems  Review of Systems   Constitutional: Positive for malaise/fatigue.   HENT: Negative.    Eyes: Negative.    Respiratory: Negative.    Cardiovascular: Positive for chest pain.   Gastrointestinal: Positive for nausea.   Genitourinary: Negative.    Musculoskeletal: Negative.    Skin: Negative.    Neurological: Negative.    Endo/Heme/Allergies: Negative.    Psychiatric/Behavioral: Negative.        Past Medical History  BPH    Surgical  History  No pertinent surgical history      Family History   Family history reviewed with patient. There is no family history that is pertinent to the chief complaint.     Social History   reports that he quit smoking about 42 years ago. He has never used smokeless tobacco. He reports current alcohol use. He reports that he does not use drugs.    Allergies  Allergies   Allergen Reactions   • Repatha [Evolocumab] Swelling     Lip swelling       Medications  Prior to Admission Medications   Prescriptions Last Dose Informant Patient Reported? Taking?   Cyanocobalamin (VITAMIN B-12) 1000 MCG Tab 2/12/2022 at am Patient Yes Yes   Sig: Take 1,000 mcg by mouth 2 times a day.   aspirin EC (ECOTRIN) 81 MG Tablet Delayed Response 2/12/2022 at am Patient Yes Yes   Sig: Take 81 mg by mouth every day.   tamsulosin (FLOMAX) 0.4 MG capsule 2/11/2022 at pm Patient Yes Yes   Sig: Take 0.4 mg by mouth every day.      Facility-Administered Medications: None       Physical Exam  Temp:  [36.6 °C (97.9 °F)] 36.6 °C (97.9 °F)  Pulse:  [66-71] 68  Resp:  [14-18] 18  BP: (117-154)/(70-87) 154/87  SpO2:  [97 %-98 %] 97 %  Blood Pressure : 154/87   Temperature: 36.6 °C (97.9 °F)   Pulse: 68   Respiration: 18   Pulse Oximetry: 97 %       Physical Exam  Constitutional:       Appearance: Normal appearance.   HENT:      Head: Normocephalic.      Nose: Nose normal.      Mouth/Throat:      Mouth: Mucous membranes are moist.   Eyes:      Pupils: Pupils are equal, round, and reactive to light.   Cardiovascular:      Rate and Rhythm: Normal rate and regular rhythm.   Pulmonary:      Effort: Pulmonary effort is normal.      Breath sounds: Normal breath sounds.   Abdominal:      General: Abdomen is flat. Bowel sounds are normal.      Palpations: Abdomen is soft.   Musculoskeletal:         General: No swelling. Normal range of motion.      Cervical back: Normal range of motion.   Skin:     General: Skin is warm.   Neurological:      General: No focal  deficit present.      Mental Status: He is alert and oriented to person, place, and time. Mental status is at baseline.   Psychiatric:         Mood and Affect: Mood normal.         Behavior: Behavior normal.         Thought Content: Thought content normal.         Judgment: Judgment normal.         Laboratory:  Recent Labs     02/12/22 1851   WBC 5.1   RBC 4.83   HEMOGLOBIN 15.2   HEMATOCRIT 44.9   MCV 93.0   MCH 31.5   MCHC 33.9   RDW 45.3   PLATELETCT 201   MPV 9.4     Recent Labs     02/12/22 1851   SODIUM 142   POTASSIUM 4.3   CHLORIDE 106   CO2 23   GLUCOSE 101*   BUN 18   CREATININE 0.96   CALCIUM 9.6     Recent Labs     02/12/22 1851   ALTSGPT 16   ASTSGOT 25   ALKPHOSPHAT 98   TBILIRUBIN 0.5   GLUCOSE 101*         No results for input(s): NTPROBNP in the last 72 hours.      Recent Labs     02/12/22 1851   TROPONINT 72*       Imaging:  DX-CHEST-PORTABLE (1 VIEW)   Final Result         1. No acute cardiopulmonary abnormalities are identified.      CL-LEFT HEART CATHETERIZATION WITH POSSIBLE INTERVENTION    (Results Pending)   EC-ECHOCARDIOGRAM COMPLETE W/O CONT    (Results Pending)       EKG:  I have personally reviewed the images and compared with prior images.    Assessment/Plan:  I anticipate this patient will require at least two midnights for appropriate medical management, necessitating inpatient admission.    * NSTEMI (non-ST elevated myocardial infarction) (HCC)- (present on admission)  Assessment & Plan  Admit patient to telemetry  Cardiology consulted, will go for cath in a.m.  TTE in AM  Troponin 72, deferred collection of serial troponins  Self administered loading dose of ASA today, and took 1 Crestor  Lipid panel, A1c    B12 deficiency  Assessment & Plan  Continue B12    BPH (benign prostatic hyperplasia)  Assessment & Plan  Continue flomax      VTE prophylaxis: heparin ppx

## 2022-02-13 NOTE — ASSESSMENT & PLAN NOTE
Admit patient to telemetry  Cardiology consulted, will go for cath in a.m.  TTE in AM  Troponin 72, deferred collection of serial troponins  Self administered loading dose of ASA today, and took 1 Crestor  Lipid panel, A1c

## 2022-02-13 NOTE — ED NOTES
Vital signs repeated.  Pt reports no changes in condition.  Apologized for wait time and returned to lobby.

## 2022-02-14 VITALS
OXYGEN SATURATION: 98 % | WEIGHT: 180.34 LBS | HEIGHT: 71 IN | HEART RATE: 88 BPM | RESPIRATION RATE: 18 BRPM | DIASTOLIC BLOOD PRESSURE: 68 MMHG | TEMPERATURE: 97.4 F | BODY MASS INDEX: 25.25 KG/M2 | SYSTOLIC BLOOD PRESSURE: 118 MMHG

## 2022-02-14 LAB
ANION GAP SERPL CALC-SCNC: 8 MMOL/L (ref 7–16)
BUN SERPL-MCNC: 20 MG/DL (ref 8–22)
CALCIUM SERPL-MCNC: 9.2 MG/DL (ref 8.5–10.5)
CHLORIDE SERPL-SCNC: 108 MMOL/L (ref 96–112)
CO2 SERPL-SCNC: 25 MMOL/L (ref 20–33)
CREAT SERPL-MCNC: 1.01 MG/DL (ref 0.5–1.4)
EKG IMPRESSION: NORMAL
ERYTHROCYTE [DISTWIDTH] IN BLOOD BY AUTOMATED COUNT: 46.1 FL (ref 35.9–50)
GLUCOSE SERPL-MCNC: 91 MG/DL (ref 65–99)
HCT VFR BLD AUTO: 42.7 % (ref 42–52)
HGB BLD-MCNC: 14.2 G/DL (ref 14–18)
MCH RBC QN AUTO: 31 PG (ref 27–33)
MCHC RBC AUTO-ENTMCNC: 33.3 G/DL (ref 33.7–35.3)
MCV RBC AUTO: 93.2 FL (ref 81.4–97.8)
PLATELET # BLD AUTO: 178 K/UL (ref 164–446)
PMV BLD AUTO: 9.6 FL (ref 9–12.9)
POTASSIUM SERPL-SCNC: 4.2 MMOL/L (ref 3.6–5.5)
RBC # BLD AUTO: 4.58 M/UL (ref 4.7–6.1)
SODIUM SERPL-SCNC: 141 MMOL/L (ref 135–145)
WBC # BLD AUTO: 5.6 K/UL (ref 4.8–10.8)

## 2022-02-14 PROCEDURE — 80048 BASIC METABOLIC PNL TOTAL CA: CPT

## 2022-02-14 PROCEDURE — 36415 COLL VENOUS BLD VENIPUNCTURE: CPT

## 2022-02-14 PROCEDURE — 97161 PT EVAL LOW COMPLEX 20 MIN: CPT

## 2022-02-14 PROCEDURE — 99239 HOSP IP/OBS DSCHRG MGMT >30: CPT | Performed by: INTERNAL MEDICINE

## 2022-02-14 PROCEDURE — 700102 HCHG RX REV CODE 250 W/ 637 OVERRIDE(OP): Performed by: INTERNAL MEDICINE

## 2022-02-14 PROCEDURE — 85027 COMPLETE CBC AUTOMATED: CPT

## 2022-02-14 PROCEDURE — 700102 HCHG RX REV CODE 250 W/ 637 OVERRIDE(OP): Performed by: STUDENT IN AN ORGANIZED HEALTH CARE EDUCATION/TRAINING PROGRAM

## 2022-02-14 PROCEDURE — 93010 ELECTROCARDIOGRAM REPORT: CPT | Performed by: INTERNAL MEDICINE

## 2022-02-14 PROCEDURE — A9270 NON-COVERED ITEM OR SERVICE: HCPCS | Performed by: INTERNAL MEDICINE

## 2022-02-14 PROCEDURE — A9270 NON-COVERED ITEM OR SERVICE: HCPCS | Performed by: STUDENT IN AN ORGANIZED HEALTH CARE EDUCATION/TRAINING PROGRAM

## 2022-02-14 PROCEDURE — 700111 HCHG RX REV CODE 636 W/ 250 OVERRIDE (IP): Performed by: STUDENT IN AN ORGANIZED HEALTH CARE EDUCATION/TRAINING PROGRAM

## 2022-02-14 PROCEDURE — 99232 SBSQ HOSP IP/OBS MODERATE 35: CPT | Performed by: NURSE PRACTITIONER

## 2022-02-14 PROCEDURE — 99232 SBSQ HOSP IP/OBS MODERATE 35: CPT | Performed by: INTERNAL MEDICINE

## 2022-02-14 RX ORDER — ROSUVASTATIN CALCIUM 10 MG/1
10 TABLET, COATED ORAL EVERY EVENING
Status: DISCONTINUED | OUTPATIENT
Start: 2022-02-14 | End: 2022-02-14 | Stop reason: HOSPADM

## 2022-02-14 RX ORDER — PRASUGREL 10 MG/1
10 TABLET, FILM COATED ORAL DAILY
Status: DISCONTINUED | OUTPATIENT
Start: 2022-02-14 | End: 2022-02-14 | Stop reason: HOSPADM

## 2022-02-14 RX ORDER — PRASUGREL 10 MG/1
10 TABLET, FILM COATED ORAL DAILY
Qty: 30 TABLET | Refills: 0 | Status: SHIPPED | OUTPATIENT
Start: 2022-02-15 | End: 2022-03-11 | Stop reason: SDUPTHER

## 2022-02-14 RX ORDER — ROSUVASTATIN CALCIUM 10 MG/1
10 TABLET, COATED ORAL EVERY EVENING
Qty: 30 TABLET | Refills: 11 | Status: SHIPPED | OUTPATIENT
Start: 2022-02-14 | End: 2022-10-18 | Stop reason: SDUPTHER

## 2022-02-14 RX ORDER — ROSUVASTATIN CALCIUM 20 MG/1
20 TABLET, COATED ORAL EVERY EVENING
Status: DISCONTINUED | OUTPATIENT
Start: 2022-02-14 | End: 2022-02-14

## 2022-02-14 RX ADMIN — CYANOCOBALAMIN TAB 500 MCG 1000 MCG: 500 TAB at 06:07

## 2022-02-14 RX ADMIN — METOPROLOL TARTRATE 25 MG: 25 TABLET, FILM COATED ORAL at 12:15

## 2022-02-14 RX ADMIN — HEPARIN SODIUM 5000 UNITS: 5000 INJECTION, SOLUTION INTRAVENOUS; SUBCUTANEOUS at 05:58

## 2022-02-14 RX ADMIN — ASPIRIN 81 MG: 81 TABLET, COATED ORAL at 05:58

## 2022-02-14 RX ADMIN — PRASUGREL 10 MG: 10 TABLET, FILM COATED ORAL at 06:45

## 2022-02-14 ASSESSMENT — FIBROSIS 4 INDEX: FIB4 SCORE: 2.53

## 2022-02-14 ASSESSMENT — GAIT ASSESSMENTS
DEVIATION: NO DEVIATION
GAIT LEVEL OF ASSIST: INDEPENDENT
DISTANCE (FEET): 400

## 2022-02-14 ASSESSMENT — COGNITIVE AND FUNCTIONAL STATUS - GENERAL
SUGGESTED CMS G CODE MODIFIER MOBILITY: CH
MOBILITY SCORE: 24

## 2022-02-14 ASSESSMENT — ENCOUNTER SYMPTOMS
SHORTNESS OF BREATH: 0
FEVER: 0
FATIGUE: 0
CHEST TIGHTNESS: 0
LIGHT-HEADEDNESS: 0
PALPITATIONS: 0
DIZZINESS: 1

## 2022-02-14 NOTE — THERAPY
"Physical Therapy   Initial Evaluation     Patient Name: John Timothy D R Lombard  Age:  71 y.o., Sex:  male  Medical Record #: [id number]  Today's Date: 2/14/2022     Precautions  Precautions: (P) Fall Risk    Assessment  Patient is 71 y.o. male s/p angioplasty and ASAEL x1. PMHx of HLD, nonobstructive CAD in LAD, and BPH. Pt with (+) OH from sit to stand, however, asymptomatic (see vitals below), BP stable with ambulation. Pt ambulated 400ft Independently with brisk pace and negative talk test. Recommend home with OP Cardiac Rehab, pt planning to attend in [place]. Pt with no further IP PT needs, d/c PT.     Plan    Recommend Physical Therapy for Evaluation only     DC Equipment Recommendations: None  Discharge Recommendations: (Home with OP Cardiac Rehab)       Subjective    \"My resting heart rate is usually in the 50's.\"      Objective     02/14/22 0851   Total Time Spent   Total Time Spent (Mins) 20   Charge Group   PT Evaluation PT Evaluation Low   Initial Contact Note    Initial Contact Note Order Received and Verified, Evaluation Only - Patient Does Not Require Further Acute Physical Therapy at this Time.  However, May Benefit from Post Acute Therapy for Higher Level Functional Deficits.   Precautions   Precautions Fall Risk   Vitals   O2 Delivery Device None - Room Air   Vitals Comments BP & HR: seated 138/65, 89; standing immediate 107/74, 113; standing steady state 107/73, 106; post ambulation immediate standing 109/73 107; post amb standing steady state 109/73, 100. Negative talk test. Met Level 2 during ambulation. C/o minimal \"wooziness\" at end of ambulation. 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 Very active, avid cross country skiier, hiker. Plans to only walk for next few weeks    Prior Level of Functional Mobility   Comments Independent with all mobility PTA   History of Falls   History of Falls No   Cognition    Cognition / Consciousness WDL   Comments Alert, very pleasant and cooperative   Gait Analysis   Gait Level Of Assist Independent   Assistive Device None   Distance (Feet) 400   # of Times Distance was Traveled 1   Deviation No deviation   # of Stairs Climbed 0   Weight Bearing Status no restrictions   Comments Negative talk test. Met Level 2 ambulation. See vitals pre/post above.    How much difficulty does the patient currently have...   Turning over in bed (including adjusting bedclothes, sheets and blankets)? 4   Sitting down on and standing up from a chair with arms (e.g., wheelchair, bedside commode, etc.) 4   Moving from lying on back to sitting on the side of the bed? 4   How much help from another person does the patient currently need...   Moving to and from a bed to a chair (including a wheelchair)? 4   Need to walk in a hospital room? 4   Climbing 3-5 steps with a railing? 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    Problem List    Problems Decreased Activity Tolerance    Anticipated Discharge Equipment and Recommendations   DC Equipment Recommendations None   Discharge Recommendations   (Home with OP Cardiac Rehab)   Interdisciplinary Plan of Care Collaboration   IDT Collaboration with  Nursing   Patient Position at End of Therapy Seated;Call Light within Reach;Tray Table within Reach;Phone within Reach   Collaboration Comments RN updated   Session Information   Date / Session Number  2/14: SWEETIE beck only. D/C PT

## 2022-02-14 NOTE — CARE PLAN
Problem: Knowledge Deficit - Standard  Goal: Patient and family/care givers will demonstrate understanding of plan of care, disease process/condition, diagnostic tests and medications  Outcome: Progressing   The patient is Stable - Low risk of patient condition declining or worsening    Shift Goals  Clinical Goals: Monitor R radial site  Patient Goals: rest  Family Goals: ariel    Progress made toward(s) clinical / shift goals:  stable radial site     Patient is not progressing towards the following goals: n/a

## 2022-02-14 NOTE — DISCHARGE SUMMARY
Discharge Summary    CHIEF COMPLAINT ON ADMISSION  Chief Complaint   Patient presents with   • Chest Pain     Sudden onset L sided chest pressure while cross-country skiing, lasted 10 minutes, resolved w/ rest, along w/ symptoms of light headedness, nausea, weakness, diaphoresis. All resolved now.        Reason for Admission  chest pain     Admission Date  2/12/2022    CODE STATUS  Full Code    HPI & HOSPITAL COURSE  This is a 71 y.o. male here with Chest Pain (Sudden onset L sided chest pressure while cross-country skiing, lasted 10 minutes, resolved w/ rest, along w/ symptoms of light headedness, nausea, weakness, diaphoresis. All resolved now. )  He is a cardiologist. He has a history of BPH and a recent abnormal stress test that was suspicious for LAD disease.. He presented with chest pain. He self administered aspirin and took Crestor. He reported he has Lipitor or simvastatin intolerance.  At the ED, he is afebrile, hemodynamically stable if not slightly hypertensive.  EKG no ST elev  Elevated troponin  CXR looks clear  Echo EF 70% no mention of wall motion.  Dr. Mock, Cardiology consulted. He underwent cardiac cath 2/13. ASAEL to LAD placed. Patient was then put on aspirin, prasugrel and Crestor. Because of soft blood pressures/orthostasis, he was not started on ACEI, instead he is cautiously started on metoprolol. Cardiac rehab planned outpatient. He was discharged home.    Therefore, he is discharged in good and stable condition to home with close outpatient follow-up.    The patient met 2-midnight criteria for an inpatient stay at the time of discharge.    Discharge Date  2/14/2022    FOLLOW UP ITEMS POST DISCHARGE      DISCHARGE DIAGNOSES  Principal Problem:    NSTEMI (non-ST elevated myocardial infarction) (HCC) POA: Yes  Active Problems:    BPH (benign prostatic hyperplasia) POA: Unknown    B12 deficiency POA: Unknown      FOLLOW UP  No future appointments.  No follow-up provider specified.  Assign  and follow up with primary provider or discharge clinic physician in 1 week. Advise John Timothy D R Lombard to check blood pressure at home at least twice a day and have a log for primary provider to review  Follow up with Dr. Mock or cardiologist in 1 week for post cath visit  Follow up with Cardiac rehab as scheduled.  Return to ER in the event of new or worsening symptoms. Please note importance of compliance and the patient has agreed to proceed with all medical recommendations and follow up plan indicated above. All medications come with benefits and risks. Risks may include permanent injury or death and these risks can be minimized with close reassessment and monitoring. Please make it to your scheduled follow ups with your primary provider, and/or specialists clinic.  Given current pandemic, advise mask use, social distancing and proper hygiene/hand washing. Vaccinations recommended when available.    MEDICATIONS ON DISCHARGE     Medication List      START taking these medications      Instructions   metoprolol tartrate 25 MG Tabs  Commonly known as: LOPRESSOR   Take 1 Tablet by mouth 2 times a day.  Dose: 25 mg     prasugrel 10 MG Tabs  Start taking on: February 15, 2022  Commonly known as: EFFIENT   Take 1 Tablet by mouth every day.  Dose: 10 mg     rosuvastatin 10 MG Tabs  Commonly known as: CRESTOR   Take 1 Tablet by mouth every evening.  Dose: 10 mg        CONTINUE taking these medications      Instructions   aspirin EC 81 MG Tbec  Commonly known as: ECOTRIN   Take 81 mg by mouth every day.  Dose: 81 mg     tamsulosin 0.4 MG capsule  Commonly known as: FLOMAX   Take 0.4 mg by mouth every day.  Dose: 0.4 mg     vitamin B-12 1000 MCG Tabs   Take 1,000 mcg by mouth 2 times a day.  Dose: 1,000 mcg            Allergies  Allergies   Allergen Reactions   • Repatha [Evolocumab] Swelling     Lip swelling       DIET  Orders Placed This Encounter   Procedures   • Diet Order Diet: Cardiac     Standing Status:    Standing     Number of Occurrences:   1     Order Specific Question:   Diet:     Answer:   Cardiac [6]       ACTIVITY  Avoid heavy lifting or strenuous activity      CONSULTATIONS  Cardiology    PROCEDURES  DX-CHEST-PORTABLE (1 VIEW)    Result Date: 2022 7:57 PM HISTORY/REASON FOR EXAM:  Chest Pain TECHNIQUE/EXAM DESCRIPTION AND NUMBER OF VIEWS: Single portable view of the chest. COMPARISON: None FINDINGS: No pulmonary infiltrates or consolidations are noted. No pleural effusion. No pneumothorax. Normal cardiopericardial silhouette.     1. No acute cardiopulmonary abnormalities are identified.    EC-ECHOCARDIOGRAM COMPLETE W/O CONT    Result Date: 2022  Transthoracic Echo Report Echocardiography Laboratory CONCLUSIONS No prior study is available for comparison. The left ventricular ejection fraction is visually estimated to be 70%. Normal right ventricular size and systolic function. Right ventricular systolic pressure is estimated to be 28 mmHg. LOMBARD, JOHN Exam Date:         2022                    07:27 Exam Location:     Inpatient Priority:          Routine Ordering Physician:        LEON WATERMAN Referring Physician:       GUEVARA Reynolds Sonographer:               Angela LOMBARDI Age:    71     Gender:    M MRN:    [id number] :    1950 BSA:    2.04   Ht (in):    71     Wt (lb):    184 Exam Type:     Complete Indications:     Acute MI ICD Codes:       410.9 CPT Codes:       33486 BP:   106    /   52     HR:   61 Technical Quality:       Good MEASUREMENTS  (Male / Female) Normal Values 2D ECHO LV Diastolic Diameter PLAX        4.4 cm                4.2 - 5.9 / 3.9 - 5.3 cm LV Systolic Diameter PLAX         2.9 cm                2.1 - 4.0 cm IVS Diastolic Thickness           0.98 cm               LVPW Diastolic Thickness          0.79 cm               LVOT Diameter                     2 cm                  Estimated LV Ejection Fraction    70 %                  LV Ejection  Fraction MOD BP       73.9 %                >= 55  % LV Ejection Fraction MOD 4C       68.6 %                LV Ejection Fraction MOD 2C       78.1 %                IVC Diameter                      1.4 cm                DOPPLER AV Peak Velocity                  1.1 m/s               AV Peak Gradient                  4.8 mmHg              AV Mean Gradient                  2.7 mmHg              LVOT Peak Velocity                0.66 m/s              AV Area Cont Eq vti               1.9 cm2               Mitral E Point Velocity           0.69 m/s              Mitral E to A Ratio               1.1                   MV Pressure Half Time             63.2 ms               MV Area PHT                       3.5 cm2               MV Deceleration Time              218 ms                Pulmonary Vein Systolic Velocity  0.46 m/s              Pulmonary Vein Diastolic Velocit  0.37 m/s              Pulmonary Vein S/D Ratio          1.2                   TR Peak Velocity                  252 cm/s              PV Peak Velocity                  1 m/s                 PV Peak Gradient                  4.1 mmHg              RVOT Peak Velocity                0.55 m/s              * Indicates values subject to auto-interpretation LV EF:  70    % FINDINGS Left Ventricle Normal left ventricular size, thickness, systolic function, and diastolic function. The left ventricular ejection fraction is visually estimated to be 70%. Right Ventricle Normal right ventricular size and systolic function. Right Atrium Normal right atrial size. Normal inferior vena cava size and inspiratory collapse. Left Atrium Normal left atrial size. Left atrial volume index is 21 mL/sq m. Mitral Valve Structurally normal mitral valve without significant stenosis or regurgitation. Aortic Valve Structurally normal aortic valve without significant stenosis or regurgitation. Tricuspid Valve Structurally normal tricuspid valve without significant stenosis. Trace  tricuspid regurgitation. Right ventricular systolic pressure is estimated to be 28 mmHg. Pulmonic Valve Structurally normal pulmonic valve without significant stenosis. Trace pulmonic insufficiency. Pericardium Normal pericardium without effusion. Aorta Normal aortic root for body surface area. The ascending aorta diameter is 2.9 cm. Kumar Mock MD (Electronically Signed) Final Date:     13 February 2022                 09:10    Please see Dr. Mock's cardiac cath note.    LABORATORY  Lab Results   Component Value Date    SODIUM 141 02/14/2022    POTASSIUM 4.2 02/14/2022    CHLORIDE 108 02/14/2022    CO2 25 02/14/2022    GLUCOSE 91 02/14/2022    BUN 20 02/14/2022    CREATININE 1.01 02/14/2022        Lab Results   Component Value Date    WBC 5.6 02/14/2022    HEMOGLOBIN 14.2 02/14/2022    HEMATOCRIT 42.7 02/14/2022    PLATELETCT 178 02/14/2022        Total time of the discharge process exceeds 35 minutes.

## 2022-02-14 NOTE — PROGRESS NOTES
Received bedside report from AMANDA Julio. POC discussed. First assessment completed, call light within reach. Fall precautions in place. Will continue to monitor.

## 2022-02-14 NOTE — PROGRESS NOTES
Patient discharged at this time. Patient understands all instructions with medications and follow up appointments. Patient escorted to elevators in stable condition.

## 2022-02-14 NOTE — CARE PLAN
Problem: Knowledge Deficit - Standard  Goal: Patient and family/care givers will demonstrate understanding of plan of care, disease process/condition, diagnostic tests and medications  Outcome: Progressing     Problem: Communication  Goal: The ability to communicate needs accurately and effectively will improve  Outcome: Progressing     Problem: Discharge Barriers/Planning  Goal: Patient's continuum of care needs are met  Outcome: Progressing   The patient is Stable - Low risk of patient condition declining or worsening    Shift Goals  Clinical Goals: Monitor BP, right radial site  Patient Goals: Rest  Family Goals: n/a    Progress made toward(s) clinical / shift goals:  This nurse assumed care at 0700. Patient up to bedside chair and ambulating in room. Patient has no complaints of pain and is calm and cooperative with treatment. Patient remains sinus michael with soft blood pressure. Will continue to monitor patient and provide care.     Patient is not progressing towards the following goals:

## 2022-02-14 NOTE — DISCHARGE PLANNING
Anticipated Discharge Disposition: Home    Action: pt pending medical clearance for discharge, status post cath 2/13,  possible MARIE today. Pt on room air, 6 clicks are 24, no case management needs anticipated    Barriers to Discharge: medical clearance    Plan: f/u with medical team and pt to discuss dc needs and barriers.

## 2022-02-14 NOTE — PROGRESS NOTES
Cardiology Follow Up Progress Note    Date of Service  2/14/2022    Attending Physician  Kumar Mock M.D.    Chief Complaint   Chest Pain     HPI  John Timothy D R Lombard is a 71 y.o. male admitted 2/12/2022 with past medical history of hyperlipidemia, nonobstructive CAD in LAD with low CCS, and BPH.  Presented with increased chest pain while skate skiing with associated diaphoresis and nausea.  Patient has history of intolerance to statin and Zetia. Patient was considering PSK9i, but never initiated.     Interim Events  2/14/2022: No overnight cardiac events. Tele monitoring personally interpreted by me shows SB-SR. VSS-soft, symptomatic; RA. Labs reviewed. Right radial site dressed, CDI. Transition Metop succ to metop tartrate with parameters to hold evening dose provided. Cardiac rehab referral placed. Continue low dose crestor as tolerated with close follow-up for PSK9i. Patient would prefer to FU with [facility] cardiology.     Review of Systems  Review of Systems   Constitutional: Negative for fatigue and fever.   Respiratory: Negative for chest tightness and shortness of breath.    Cardiovascular: Negative for chest pain, palpitations and leg swelling.   Genitourinary: Negative for dysuria.   Neurological: Positive for dizziness. Negative for syncope and light-headedness.       Vital signs in last 24 hours  Temp:  [36.2 °C (97.1 °F)-37.1 °C (98.8 °F)] 36.6 °C (97.9 °F)  Pulse:  [51-93] 83  Resp:  [10-31] 16  BP: ()/(53-70) 129/70  SpO2:  [91 %-100 %] 97 %    Physical Exam  Physical Exam  Vitals and nursing note reviewed.   Constitutional:       General: He is not in acute distress.  Cardiovascular:      Rate and Rhythm: Normal rate and regular rhythm.      Pulses: Normal pulses.      Heart sounds: Normal heart sounds.   Pulmonary:      Effort: Pulmonary effort is normal. No respiratory distress.      Breath sounds: Normal breath sounds.   Musculoskeletal:         General: No swelling.       Cervical back: Normal range of motion.      Right lower leg: No edema.      Left lower leg: No edema.   Skin:     General: Skin is warm and dry.      Comments: Right radial site, dressed, CDI    Neurological:      General: No focal deficit present.      Mental Status: He is alert and oriented to person, place, and time. Mental status is at baseline.   Psychiatric:         Mood and Affect: Mood normal.         Behavior: Behavior normal.         Lab Review  Lab Results   Component Value Date/Time    WBC 5.6 02/14/2022 01:06 AM    RBC 4.58 (L) 02/14/2022 01:06 AM    HEMOGLOBIN 14.2 02/14/2022 01:06 AM    HEMATOCRIT 42.7 02/14/2022 01:06 AM    MCV 93.2 02/14/2022 01:06 AM    MCH 31.0 02/14/2022 01:06 AM    MCHC 33.3 (L) 02/14/2022 01:06 AM    MPV 9.6 02/14/2022 01:06 AM      Lab Results   Component Value Date/Time    SODIUM 141 02/14/2022 01:06 AM    POTASSIUM 4.2 02/14/2022 01:06 AM    CHLORIDE 108 02/14/2022 01:06 AM    CO2 25 02/14/2022 01:06 AM    GLUCOSE 91 02/14/2022 01:06 AM    BUN 20 02/14/2022 01:06 AM    CREATININE 1.01 02/14/2022 01:06 AM      Lab Results   Component Value Date/Time    ASTSGOT 25 02/12/2022 06:51 PM    ALTSGPT 16 02/12/2022 06:51 PM     Lab Results   Component Value Date/Time    CHOLSTRLTOT 149 02/12/2022 08:46 PM    LDL 86 02/12/2022 08:46 PM    HDL 46 02/12/2022 08:46 PM    TRIGLYCERIDE 86 02/12/2022 08:46 PM    TROPONINT 52 (H) 02/13/2022 03:02 AM       No results for input(s): NTPROBNP in the last 72 hours.    Cardiac Imaging and Procedures Review  EKG:  My personal interpretation of the EKG dated 2/13/2022 is SB    Echocardiogram (2/13/2022):  CONCLUSIONS  No prior study is available for comparison.   The left ventricular ejection fraction is visually estimated to be 70%.  Normal right ventricular size and systolic function.  Right ventricular systolic pressure is estimated to be 28 mmHg.    Cardiac Catheterization (2/13/2022):    Coronary arteriograms:  Left main: mild plaque about 10%  distal left main disease.  Left anterior descending: Ostial LAD is about 20 to 30% stenosis.,  Very proximal LAD has 70% stenosis, IFR across this lesion is 0.89.  Large type III LAD, gives several small diagonal branches in mid to distal portion  Left circumflex: normal. four major marginal branches are noted. No significant disease is noted in marginal branches.  Right coronary: normal, dominant     Left Heart Catheterization:  Left Ventriculogram: Not performed  Left Ventricular EDP:  17 mm Hg   Aortic Valve Gradient: No significant AV gradient noted    Procedures:  · Insertion of 5/6 FR sheath in the right radial artery  · right and left coronary arteriograms  · Left heart catheterization  · IFR of LAD  · IVUS guided angioplasty and placement of a 3.5 by 22mm Holden drug-eluting stent in proximal  left anterior descending coronary artery.  ·    Final diagnosis:   single vessel coronary artery disease.  Successful percutaneous intervention of left anterior descending coronary artery.     Recommendations: Guideline directed medical therapy and risk factor management    Imaging  Chest X-Ray (2/12/2022):  No acute cardiopulmonary abnormalities are identified.       Assessment/Plan  NSTEMI; HLD  -ASA  -Prasugrel 10 mg daily  -[facility] S/P PCI to pLAD   -Metoprolol tartrate 12.5 mg BID. Discussed holding evening dose for low BP/HR PRN  -EF <70%.  -LDL 86. No at goal Hx statin and xetia. Re-trial reduced rosuvastatin dose. PSK9i in FU   -Meds-to-beds on discharge  -Cardiac rehab referral placed  Education   -Radial site care instructions provided  -Discussed returning to ER if chest pain returns and/or call cardiology with any additional new or worsening symptoms  -Discussed CV risk factor modification including cholesterol management, blood pressure management, smoking cessation, diet and lifestyle changes.    Thank you for allowing me to participate in the care of this patient.  Cardiology will sign off on this patient.  Patient requesting follow up and cardiac rehab with [facility].     Please contact me with any questions.    VERN Koch.   [facility]  [phone]

## 2022-02-14 NOTE — CARE PLAN
The patient is Stable - Low risk of patient condition declining or worsening    Shift Goals  Clinical Goals: monitor R radial site  Patient Goals: rest  Family Goals: n/a    Progress made toward(s) clinical / shift goals:    Problem: Knowledge Deficit - Standard  Goal: Patient and family/care givers will demonstrate understanding of plan of care, disease process/condition, diagnostic tests and medications  Outcome: Progressing  Note: Discuss and review POC with patient/family. 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 Re-educate as needed.       Problem: Communication  Goal: The ability to communicate needs accurately and effectively will improve  Outcome: Progressing  Flowsheets (Taken 2/13/2022 2003)  Communication:   Assessed patient's ability to understand and communicate   Oriented patient to call light   Reoriented patient to environment  Note: Call light within reach. Educated pt to use call light as needed. Reorient and re-educate as needed. Continue to monitor.       Problem: Discharge Barriers/Planning  Goal: Patient's continuum of care needs are met  Outcome: Progressing  Flowsheets (Taken 2/13/2022 2003)  Continuum of Care Needs: Assessed for discharge barriers  Note: No barriers to discharge at this time. Anticipated 10x10.        Patient is not progressing towards the following goals: N/A

## 2022-02-14 NOTE — DISCHARGE INSTRUCTIONS
Follow up with primary provider or discharge clinic physician in 1 week. Advise John Timothy D R Lombard to check blood pressure at home at least twice a day and have a log for primary provider to review  Follow up with Dr. Mock or cardiologist in 1 week for post cath visit  Follow up with Cardiac rehab as scheduled.  Return to ER in the event of new or worsening symptoms. Please note importance of compliance and the patient has agreed to proceed with all medical recommendations and follow up plan indicated above. All medications come with benefits and risks. Risks may include permanent injury or death and these risks can be minimized with close reassessment and monitoring. Please make it to your scheduled follow ups with your primary provider, and/or specialists clinic.  Given current pandemic, advise mask use, social distancing and proper hygiene/hand washing. Vaccinations recommended when available.          Discharge Instructions    Discharged to home by car with relative. Discharged via wheelchair, hospital escort: Yes.  Special equipment needed: Not Applicable    Be sure to schedule a follow-up appointment with your primary care doctor or any specialists as instructed.     Discharge Plan:   Influenza Vaccine Indication: Indicated: 65 years and older    I understand that a diet low in cholesterol, fat, and sodium is recommended for good health. Unless I have been given specific instructions below for another diet, I accept this instruction as my diet prescription.   Other diet: cardiac    Special Instructions:  Depression / Suicide Risk    As you are discharged from this [facility] facility, it is important to learn how to keep safe from harming yourself.    Recognize the warning signs:  · Abrupt changes in personality, positive or negative- including increase in energy   · Giving away possessions  · Change in eating patterns- significant weight changes-  positive or negative  · Change in sleeping  patterns- unable to sleep or sleeping all the time   · Unwillingness or inability to communicate  · Depression  · Unusual sadness, discouragement and loneliness  · Talk of wanting to die  · Neglect of personal appearance   · Rebelliousness- reckless behavior  · Withdrawal from people/activities they love  · Confusion- inability to concentrate     If you or a loved one observes any of these behaviors or has concerns about self-harm, here's what you can do:  · Talk about it- your feelings and reasons for harming yourself  · Remove any means that you might use to hurt yourself (examples: pills, rope, extension cords, firearm)  · Get professional help from the community (Mental Health, Substance Abuse, psychological counseling)  · Do not be alone:Call your Safe Contact- someone whom you trust who will be there for you.  · Call your local CRISIS HOTLINE 490-3656 or 010-449-3600  · Call your local Children's Mobile Crisis Response Team [place] (671) 531-3879 or www.Rabbit  · Call the toll free National Suicide Prevention Hotlines   · National Suicide Prevention Lifeline 980-188-JCJF (0215)  · National Hope Line Network 800-SUICIDE (298-0158)

## 2022-02-23 ENCOUNTER — PATIENT OUTREACH (OUTPATIENT)
Dept: HEALTH INFORMATION MANAGEMENT | Facility: OTHER | Age: 72
End: 2022-02-23
Payer: COMMERCIAL

## 2022-02-23 NOTE — PROGRESS NOTES
Community Health Worker Intake  • Social determinates of health intake : Complete  • Identified barriers to : None  • Contact information provided to Arias Staples EUGENE CAZARES Lombard : Yes  • Has PCP appointment scheduled for : Cardiology 04/26/22 @ 7:40 am  • Scheduled Food Delivery/Home Visit/Outpatient Visit:No  • Outpatient assessment completed.  • Did the patient receive medications post discharge: Yes    VIRGIL Cortes called patient via TC and introduced Community Care Management and completed SDOH. Patient states he is still working and very active and denies any barriers with food, housing. Patient is an active . Patient states all he needs is to schedule a hospital follow up with Cardiology.     VIRGIL Cortes scheduled an appointment with Dr Mock 04/26/22 @ 7:40 am ( first available) . Patient declined a sooner appointment as patient wants to see Dr Mock only.  VIRGIL Cortes provided all contact information and will discharge patient from CCM and remove from case load and master list as all goals met.

## 2022-02-25 ENCOUNTER — TELEPHONE (OUTPATIENT)
Dept: CARDIOLOGY | Facility: MEDICAL CENTER | Age: 72
End: 2022-02-25
Payer: COMMERCIAL

## 2022-02-25 NOTE — TELEPHONE ENCOUNTER
----- Message from Kumar Mock M.D. 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  Can you please call him, you can put him on March 10 4:20 pm.   Or you can put him on 7 March around 10 AM.

## 2022-02-25 NOTE — TELEPHONE ENCOUNTER
Spoke to patient over phone. Patient would like visit with AK on 03/07/22 at 10:00. Patient rescheduled with assistance from Angelia KNIGHT. AK notified.

## 2022-03-07 ENCOUNTER — OFFICE VISIT (OUTPATIENT)
Dept: CARDIOLOGY | Facility: MEDICAL CENTER | Age: 72
End: 2022-03-07
Payer: MEDICARE

## 2022-03-07 VITALS
SYSTOLIC BLOOD PRESSURE: 102 MMHG | HEIGHT: 71 IN | WEIGHT: 194 LBS | HEART RATE: 58 BPM | OXYGEN SATURATION: 98 % | DIASTOLIC BLOOD PRESSURE: 52 MMHG | BODY MASS INDEX: 27.16 KG/M2 | RESPIRATION RATE: 14 BRPM

## 2022-03-07 DIAGNOSIS — I25.83 CORONARY ARTERY DISEASE DUE TO LIPID RICH PLAQUE: ICD-10-CM

## 2022-03-07 DIAGNOSIS — I25.10 CORONARY ARTERY DISEASE DUE TO LIPID RICH PLAQUE: ICD-10-CM

## 2022-03-07 PROBLEM — S86.111A STRAIN OF RIGHT GASTROCNEMIUS MUSCLE: Status: ACTIVE | Noted: 2021-03-15

## 2022-03-07 PROBLEM — D70.3 NEUTROPENIA ASSOCIATED WITH INFECTION (HCC): Status: ACTIVE | Noted: 2022-03-07

## 2022-03-07 PROBLEM — N50.819 PAIN IN TESTICLE: Status: ACTIVE | Noted: 2021-05-10

## 2022-03-07 PROBLEM — D69.6 THROMBOCYTOPENIA (HCC): Status: ACTIVE | Noted: 2022-03-07

## 2022-03-07 PROBLEM — R39.9 LOWER URINARY TRACT SYMPTOMS: Status: ACTIVE | Noted: 2021-05-10

## 2022-03-07 PROBLEM — K63.5 COLON POLYPS: Status: ACTIVE | Noted: 2018-10-23

## 2022-03-07 PROCEDURE — 99213 OFFICE O/P EST LOW 20 MIN: CPT | Performed by: INTERNAL MEDICINE

## 2022-03-07 RX ORDER — INCLISIRAN 284 MG/1.5ML
INJECTION, SOLUTION SUBCUTANEOUS
COMMUNITY
Start: 2022-03-03

## 2022-03-07 RX ORDER — METOPROLOL SUCCINATE 50 MG/1
25 TABLET, EXTENDED RELEASE ORAL DAILY
COMMUNITY
Start: 2022-02-28 | End: 2022-10-18 | Stop reason: SDUPTHER

## 2022-03-07 ASSESSMENT — FIBROSIS 4 INDEX: FIB4 SCORE: 2.53

## 2022-03-09 ENCOUNTER — PATIENT MESSAGE (OUTPATIENT)
Dept: CARDIOLOGY | Facility: MEDICAL CENTER | Age: 72
End: 2022-03-09
Payer: COMMERCIAL

## 2022-03-09 DIAGNOSIS — I21.4 NSTEMI (NON-ST ELEVATED MYOCARDIAL INFARCTION) (HCC): ICD-10-CM

## 2022-03-09 NOTE — PROGRESS NOTES
"      Cardiology Follow-up Consultation Note        CC: Follow-up coronary artery disease    Patient ID/HPI:   71-year-old male patient with dyslipidemia, coronary artery disease s/p PCI of LAD here for follow-up.  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  He has not been very active since his PCI but denies chest pain, shortness of breath.          History reviewed. No pertinent past medical history.      Current Outpatient Medications   Medication Sig Dispense Refill   • metoprolol SR (TOPROL XL) 50 MG TABLET SR 24 HR Take 25 mg by mouth every day.     • prasugrel (EFFIENT) 10 MG Tab Take 1 Tablet by mouth every day. 30 Tablet 0   • rosuvastatin (CRESTOR) 10 MG Tab Take 1 Tablet by mouth every evening. 30 Tablet 11   • tamsulosin (FLOMAX) 0.4 MG capsule Take 0.4 mg by mouth every day.     • Cyanocobalamin (VITAMIN B-12) 1000 MCG Tab Take 1,000 mcg by mouth 2 times a day.     • aspirin EC (ECOTRIN) 81 MG Tablet Delayed Response Take 81 mg by mouth every day.     • LEQVIO 284 MG/1.5ML injection        No current facility-administered medications for this visit.         Physical Exam:  Ambulatory Vitals  /52 (BP Location: Left arm, Patient Position: Sitting, BP Cuff Size: Adult)   Pulse (!) 58   Resp 14   Ht 1.803 m (5' 11\")   Wt 88 kg (194 lb)   SpO2 98%    Oxygen Therapy:     BP Readings from Last 4 Encounters:   03/07/22 102/52   02/14/22 118/68       Weight/BMI: Body mass index is 27.06 kg/m².  Wt Readings from Last 4 Encounters:   03/07/22 88 kg (194 lb)   02/14/22 81.8 kg (180 lb 5.4 oz)       General: Well appearing and in no apparent distress  Neck: JVP absent, carotid bruits absent  Lungs: respiratory sounds  normal, additional breath sounds absent  Heart: Regular rhythm,   No palpable thrills on palpation, murmurs absent, no rubs,   Lower extremity edema absent.       Impression and Plan:  71-year-old male patient with dyslipidemia, coronary artery disease status post PCI of left anterior descending " artery here for follow-up.  Recommend aspirin, prasugrel for 6 months.  Prasugrel can be switched with Plavix after 6 months, continue dual antiplatelet therapy for total 2 years.  Heart rate is on the lower side with occasional dizziness, decrease metoprolol succinate to 25 mg daily.  Plan to continue metoprolol for 6 months.  He is going to start Leqvio, will repeat lipid panel in about 3 months.      Return in about 6 months (around 9/7/2022).      Kumar CASTILLO  Interventional cardiologist  [facility], [address]  [address] [address]  Phone: [phone]  Fax: [phone]

## 2022-03-11 RX ORDER — PRASUGREL 10 MG/1
10 TABLET, FILM COATED ORAL DAILY
Qty: 90 TABLET | Refills: 1 | Status: SHIPPED | OUTPATIENT
Start: 2022-03-11 | End: 2022-10-18 | Stop reason: SDUPTHER

## 2022-03-17 DIAGNOSIS — I25.83 CORONARY ARTERY DISEASE DUE TO LIPID RICH PLAQUE: ICD-10-CM

## 2022-03-17 DIAGNOSIS — I25.10 CORONARY ARTERY DISEASE DUE TO LIPID RICH PLAQUE: ICD-10-CM

## 2022-07-12 ENCOUNTER — APPOINTMENT (RX ONLY)
Dept: URBAN - NONMETROPOLITAN AREA CLINIC 1 | Facility: CLINIC | Age: 72
Setting detail: DERMATOLOGY
End: 2022-07-12

## 2022-07-12 DIAGNOSIS — Z87.2 PERSONAL HISTORY OF DISEASES OF THE SKIN AND SUBCUTANEOUS TISSUE: ICD-10-CM

## 2022-07-12 DIAGNOSIS — L81.4 OTHER MELANIN HYPERPIGMENTATION: ICD-10-CM

## 2022-07-12 DIAGNOSIS — L57.8 OTHER SKIN CHANGES DUE TO CHRONIC EXPOSURE TO NONIONIZING RADIATION: ICD-10-CM

## 2022-07-12 PROCEDURE — ? COUNSELING

## 2022-07-12 PROCEDURE — 99213 OFFICE O/P EST LOW 20 MIN: CPT

## 2022-07-12 PROCEDURE — ? PRESCRIPTION

## 2022-07-12 RX ORDER — HYDROQUINONE 4 %
1 CREAM (GRAM) TOPICAL BID
Qty: 1 | Refills: 3 | Status: ERX | COMMUNITY
Start: 2022-07-12

## 2022-07-12 RX ADMIN — Medication 1: at 00:00

## 2022-07-12 ASSESSMENT — LOCATION DETAILED DESCRIPTION DERM
LOCATION DETAILED: RIGHT CENTRAL MALAR CHEEK
LOCATION DETAILED: RIGHT SUPERIOR MEDIAL LOWER BACK
LOCATION DETAILED: LEFT CENTRAL MALAR CHEEK

## 2022-07-12 ASSESSMENT — LOCATION ZONE DERM
LOCATION ZONE: TRUNK
LOCATION ZONE: FACE

## 2022-07-12 ASSESSMENT — LOCATION SIMPLE DESCRIPTION DERM
LOCATION SIMPLE: RIGHT CHEEK
LOCATION SIMPLE: LEFT CHEEK
LOCATION SIMPLE: RIGHT LOWER BACK

## 2022-08-15 ENCOUNTER — PATIENT MESSAGE (OUTPATIENT)
Dept: CARDIOLOGY | Facility: MEDICAL CENTER | Age: 72
End: 2022-08-15
Payer: COMMERCIAL

## 2022-08-15 DIAGNOSIS — I21.4 NSTEMI (NON-ST ELEVATED MYOCARDIAL INFARCTION) (HCC): ICD-10-CM

## 2022-08-15 DIAGNOSIS — I25.83 CORONARY ARTERY DISEASE DUE TO LIPID RICH PLAQUE: ICD-10-CM

## 2022-08-15 DIAGNOSIS — I25.10 CORONARY ARTERY DISEASE DUE TO LIPID RICH PLAQUE: ICD-10-CM

## 2022-08-16 DIAGNOSIS — I25.10 CORONARY ARTERY DISEASE DUE TO LIPID RICH PLAQUE: ICD-10-CM

## 2022-08-16 DIAGNOSIS — I25.83 CORONARY ARTERY DISEASE DUE TO LIPID RICH PLAQUE: ICD-10-CM

## 2022-10-03 ENCOUNTER — HOSPITAL ENCOUNTER (OUTPATIENT)
Dept: LAB | Facility: MEDICAL CENTER | Age: 72
End: 2022-10-03
Attending: NURSE PRACTITIONER
Payer: MEDICARE

## 2022-10-03 DIAGNOSIS — I25.10 CORONARY ARTERY DISEASE DUE TO LIPID RICH PLAQUE: ICD-10-CM

## 2022-10-03 DIAGNOSIS — I21.4 NSTEMI (NON-ST ELEVATED MYOCARDIAL INFARCTION) (HCC): ICD-10-CM

## 2022-10-03 DIAGNOSIS — I25.83 CORONARY ARTERY DISEASE DUE TO LIPID RICH PLAQUE: ICD-10-CM

## 2022-10-03 LAB
ALBUMIN SERPL BCP-MCNC: 4.1 G/DL (ref 3.2–4.9)
ALBUMIN/GLOB SERPL: 2 G/DL
ALP SERPL-CCNC: 80 U/L (ref 30–99)
ALT SERPL-CCNC: 18 U/L (ref 2–50)
ANION GAP SERPL CALC-SCNC: 8 MMOL/L (ref 7–16)
AST SERPL-CCNC: 26 U/L (ref 12–45)
BASOPHILS # BLD AUTO: 1 % (ref 0–1.8)
BASOPHILS # BLD: 0.04 K/UL (ref 0–0.12)
BILIRUB SERPL-MCNC: 0.8 MG/DL (ref 0.1–1.5)
BUN SERPL-MCNC: 22 MG/DL (ref 8–22)
CALCIUM SERPL-MCNC: 9.1 MG/DL (ref 8.5–10.5)
CHLORIDE SERPL-SCNC: 106 MMOL/L (ref 96–112)
CHOLEST SERPL-MCNC: 103 MG/DL (ref 100–199)
CO2 SERPL-SCNC: 27 MMOL/L (ref 20–33)
CREAT SERPL-MCNC: 0.96 MG/DL (ref 0.5–1.4)
EOSINOPHIL # BLD AUTO: 0.05 K/UL (ref 0–0.51)
EOSINOPHIL NFR BLD: 1.2 % (ref 0–6.9)
ERYTHROCYTE [DISTWIDTH] IN BLOOD BY AUTOMATED COUNT: 48.3 FL (ref 35.9–50)
FASTING STATUS PATIENT QL REPORTED: NORMAL
GFR SERPLBLD CREATININE-BSD FMLA CKD-EPI: 83 ML/MIN/1.73 M 2
GLOBULIN SER CALC-MCNC: 2.1 G/DL (ref 1.9–3.5)
GLUCOSE SERPL-MCNC: 89 MG/DL (ref 65–99)
HCT VFR BLD AUTO: 42.8 % (ref 42–52)
HDLC SERPL-MCNC: 49 MG/DL
HGB BLD-MCNC: 13.7 G/DL (ref 14–18)
IMM GRANULOCYTES # BLD AUTO: 0.01 K/UL (ref 0–0.11)
IMM GRANULOCYTES NFR BLD AUTO: 0.2 % (ref 0–0.9)
LDLC SERPL CALC-MCNC: 43 MG/DL
LYMPHOCYTES # BLD AUTO: 1.39 K/UL (ref 1–4.8)
LYMPHOCYTES NFR BLD: 33.3 % (ref 22–41)
MCH RBC QN AUTO: 31 PG (ref 27–33)
MCHC RBC AUTO-ENTMCNC: 32 G/DL (ref 33.7–35.3)
MCV RBC AUTO: 96.8 FL (ref 81.4–97.8)
MONOCYTES # BLD AUTO: 0.45 K/UL (ref 0–0.85)
MONOCYTES NFR BLD AUTO: 10.8 % (ref 0–13.4)
NEUTROPHILS # BLD AUTO: 2.24 K/UL (ref 1.82–7.42)
NEUTROPHILS NFR BLD: 53.5 % (ref 44–72)
NRBC # BLD AUTO: 0 K/UL
NRBC BLD-RTO: 0 /100 WBC
PLATELET # BLD AUTO: 174 K/UL (ref 164–446)
PMV BLD AUTO: 9.7 FL (ref 9–12.9)
POTASSIUM SERPL-SCNC: 4.8 MMOL/L (ref 3.6–5.5)
PROT SERPL-MCNC: 6.2 G/DL (ref 6–8.2)
RBC # BLD AUTO: 4.42 M/UL (ref 4.7–6.1)
SODIUM SERPL-SCNC: 141 MMOL/L (ref 135–145)
TRIGL SERPL-MCNC: 57 MG/DL (ref 0–149)
WBC # BLD AUTO: 4.2 K/UL (ref 4.8–10.8)

## 2022-10-03 PROCEDURE — 80053 COMPREHEN METABOLIC PANEL: CPT

## 2022-10-03 PROCEDURE — 85025 COMPLETE CBC W/AUTO DIFF WBC: CPT

## 2022-10-03 PROCEDURE — 80061 LIPID PANEL: CPT

## 2022-10-03 PROCEDURE — 36415 COLL VENOUS BLD VENIPUNCTURE: CPT

## 2022-10-03 PROCEDURE — 81225 CYP2C19 GENE COM VARIANTS: CPT

## 2022-10-03 PROCEDURE — 369999 HCHG MISC LAB CHARGE

## 2022-10-10 LAB — TEST NAME 95000: NORMAL

## 2022-10-18 ENCOUNTER — OFFICE VISIT (OUTPATIENT)
Dept: CARDIOLOGY | Facility: MEDICAL CENTER | Age: 72
End: 2022-10-18
Payer: MEDICARE

## 2022-10-18 VITALS
HEART RATE: 58 BPM | SYSTOLIC BLOOD PRESSURE: 118 MMHG | BODY MASS INDEX: 25.06 KG/M2 | OXYGEN SATURATION: 98 % | RESPIRATION RATE: 14 BRPM | WEIGHT: 179 LBS | HEIGHT: 71 IN | DIASTOLIC BLOOD PRESSURE: 60 MMHG

## 2022-10-18 DIAGNOSIS — I25.10 CORONARY ARTERY DISEASE INVOLVING NATIVE CORONARY ARTERY OF NATIVE HEART WITHOUT ANGINA PECTORIS: ICD-10-CM

## 2022-10-18 DIAGNOSIS — E78.49 OTHER HYPERLIPIDEMIA: ICD-10-CM

## 2022-10-18 DIAGNOSIS — I21.4 NSTEMI (NON-ST ELEVATED MYOCARDIAL INFARCTION) (HCC): ICD-10-CM

## 2022-10-18 PROBLEM — N50.819 PAIN IN TESTICLE: Status: RESOLVED | Noted: 2021-05-10 | Resolved: 2022-10-18

## 2022-10-18 PROBLEM — S86.111A STRAIN OF RIGHT GASTROCNEMIUS MUSCLE: Status: RESOLVED | Noted: 2021-03-15 | Resolved: 2022-10-18

## 2022-10-18 PROBLEM — K63.5 COLON POLYPS: Status: RESOLVED | Noted: 2018-10-23 | Resolved: 2022-10-18

## 2022-10-18 PROBLEM — R39.9 LOWER URINARY TRACT SYMPTOMS: Status: RESOLVED | Noted: 2021-05-10 | Resolved: 2022-10-18

## 2022-10-18 PROBLEM — D70.3 NEUTROPENIA ASSOCIATED WITH INFECTION (HCC): Status: RESOLVED | Noted: 2022-03-07 | Resolved: 2022-10-18

## 2022-10-18 PROBLEM — D69.6 THROMBOCYTOPENIA (HCC): Status: RESOLVED | Noted: 2022-03-07 | Resolved: 2022-10-18

## 2022-10-18 PROCEDURE — 99214 OFFICE O/P EST MOD 30 MIN: CPT | Performed by: NURSE PRACTITIONER

## 2022-10-18 RX ORDER — ROSUVASTATIN CALCIUM 10 MG/1
10 TABLET, COATED ORAL EVERY EVENING
Qty: 90 TABLET | Refills: 3 | Status: SHIPPED | OUTPATIENT
Start: 2022-10-18 | End: 2023-07-28 | Stop reason: SINTOL

## 2022-10-18 RX ORDER — METOPROLOL SUCCINATE 25 MG/1
25 TABLET, EXTENDED RELEASE ORAL EVERY EVENING
Qty: 90 TABLET | Refills: 3 | Status: SHIPPED | OUTPATIENT
Start: 2022-10-18 | End: 2023-09-18

## 2022-10-18 RX ORDER — PRASUGREL 10 MG/1
10 TABLET, FILM COATED ORAL EVERY EVENING
Qty: 90 TABLET | Refills: 3 | Status: SHIPPED | OUTPATIENT
Start: 2022-10-18 | End: 2023-07-28

## 2022-10-18 ASSESSMENT — FIBROSIS 4 INDEX: FIB4 SCORE: 2.54

## 2022-10-19 ASSESSMENT — ENCOUNTER SYMPTOMS
FEVER: 0
SHORTNESS OF BREATH: 1
ORTHOPNEA: 0
CLAUDICATION: 0
COUGH: 0
MYALGIAS: 0
PALPITATIONS: 0
ABDOMINAL PAIN: 0
PND: 0
DIZZINESS: 0

## 2022-10-19 NOTE — PROGRESS NOTES
Chief Complaint   Patient presents with    MI (Non ST Segment Elevation MI)    Coronary Artery Disease     F/V Dx: Coronary artery disease due to lipid rich plaque     Subjective     Flo Jhoanothy D R Lombard is a 71 y.o. male who presents today for follow up.    He is a patient of Dr. Mock in our office. Hx of HLD with CAD with PCI to LAD with NSTEMI in , BPH, colonic polyps, B12 deficiency, and mild dysphagia.    He presents today alone. 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    He is happy with his lab results and his activity tolerance.    He has noted some mild nocturnal dyspnea. It is improving, correlates this to side effects of medications.    He has no chest pain, edema, dizziness/lightheadedness, or palpitations.    History reviewed. No pertinent past medical history.  History reviewed. No pertinent surgical history.  History reviewed. No pertinent family history.  Social History     Socioeconomic History    Marital status:      Spouse name: Not on file    Number of children: Not on file    Years of education: Not on file    Highest education level: Not on file   Occupational History    Not on file   Tobacco Use    Smoking status: Former     Types: Cigarettes     Quit date: 1980     Years since quittin.7    Smokeless tobacco: Never   Vaping Use    Vaping Use: Never used   Substance and Sexual Activity    Alcohol use: Yes     Comment: rarely     Drug use: Never    Sexual activity: Not on file   Other Topics Concern    Not on file   Social History Narrative    Not on file     Social Determinants of Health     Financial Resource Strain: Not on file   Food Insecurity: Not on file   Transportation Needs: Not on file   Physical Activity: Not on file   Stress: Not on file   Social Connections: Not on file   Intimate Partner Violence: Not on file   Housing Stability: Not on file     Allergies   Allergen Reactions     "Repatha [Evolocumab] Swelling     Lip swelling    Doxycycline Unspecified     Queasy, malaise    Sulfamethoxazole-Trimethoprim Vomiting     nausea     Outpatient Encounter Medications as of 10/18/2022   Medication Sig Dispense Refill    metoprolol SR (TOPROL XL) 25 MG TABLET SR 24 HR Take 1 Tablet by mouth every evening. 90 Tablet 3    rosuvastatin (CRESTOR) 10 MG Tab Take 1 Tablet by mouth every evening. 90 Tablet 3    prasugrel (EFFIENT) 10 MG Tab Take 1 Tablet by mouth every evening. 90 Tablet 3    LEQVIO 284 MG/1.5ML injection       tamsulosin (FLOMAX) 0.4 MG capsule Take 0.4 mg by mouth every day.      Cyanocobalamin (VITAMIN B-12) 1000 MCG Tab Take 1,000 mcg by mouth 2 times a day.      aspirin EC (ECOTRIN) 81 MG Tablet Delayed Response Take 81 mg by mouth every day.      [DISCONTINUED] prasugrel (EFFIENT) 10 MG Tab Take 1 Tablet by mouth every day. 90 Tablet 1    [DISCONTINUED] metoprolol SR (TOPROL XL) 50 MG TABLET SR 24 HR Take 25 mg by mouth every day.      [DISCONTINUED] rosuvastatin (CRESTOR) 10 MG Tab Take 1 Tablet by mouth every evening. 30 Tablet 11     No facility-administered encounter medications on file as of 10/18/2022.     Review of Systems   Constitutional:  Negative for fever and malaise/fatigue.   Respiratory:  Positive for shortness of breath. Negative for cough.         Occasional nocturnal dyspnea events, minimal and not too bothersome   Cardiovascular:  Negative for chest pain, palpitations, orthopnea, claudication, leg swelling and PND.   Gastrointestinal:  Negative for abdominal pain.   Musculoskeletal:  Negative for myalgias.   Neurological:  Negative for dizziness.            Objective     /60 (BP Location: Left arm, Patient Position: Sitting, BP Cuff Size: Adult)   Pulse (!) 58   Resp 14   Ht 1.803 m (5' 11\")   Wt 81.2 kg (179 lb)   SpO2 98%   BMI 24.97 kg/m²     Physical Exam  Vitals and nursing note reviewed.   Constitutional:       Appearance: Normal appearance. He is " well-developed and normal weight.   HENT:      Head: Normocephalic and atraumatic.   Neck:      Vascular: No JVD.   Cardiovascular:      Rate and Rhythm: Normal rate and regular rhythm.      Pulses: Normal pulses.      Heart sounds: Normal heart sounds.   Pulmonary:      Effort: Pulmonary effort is normal.      Breath sounds: Normal breath sounds.   Musculoskeletal:         General: Normal range of motion.   Skin:     General: Skin is warm and dry.      Capillary Refill: Capillary refill takes less than 2 seconds.   Neurological:      General: No focal deficit present.      Mental Status: He is alert and oriented to person, place, and time. Mental status is at baseline.   Psychiatric:         Mood and Affect: Mood normal.         Behavior: Behavior normal.         Thought Content: Thought content normal.         Judgment: Judgment normal.              Assessment & Plan     1. NSTEMI (non-ST elevated myocardial infarction) (HCC) Acute rosuvastatin (CRESTOR) 10 MG Tab    prasugrel (EFFIENT) 10 MG Tab      2. Other hyperlipidemia  Lipid Profile    Comp Metabolic Panel      3. Coronary artery disease involving native coronary artery of native heart without angina pectoris  CBC WITHOUT DIFFERENTIAL        Medical Decision Making: Today's Assessment/Status/Plan:      1. HLD with CAD with PCI to LAD with NSTEMI in '21  -no exertional symptoms, no angina or ECHEVARRIA  -cont asa, statin lifelong  -prasugrel for 6 months post PCI, ok to stay on low dose plasugrel for 2 years post PCI, ok to transition to plavix if wanted  -tolerating leqvio, LDL at goal <70 with CAD  -check lipid/bmp annually  -tolerating low dose bb, toprol at 25 mg QPM, okay to lower dose for side effects if wanted    Patient is to follow up with Dr. Mock in 1 year with annual labs. All medications refilled for one year.

## 2023-04-19 ENCOUNTER — HOSPITAL ENCOUNTER (OUTPATIENT)
Dept: LAB | Facility: MEDICAL CENTER | Age: 73
End: 2023-04-19
Attending: NURSE PRACTITIONER
Payer: MEDICARE

## 2023-04-19 DIAGNOSIS — I25.10 CORONARY ARTERY DISEASE INVOLVING NATIVE CORONARY ARTERY OF NATIVE HEART WITHOUT ANGINA PECTORIS: ICD-10-CM

## 2023-04-19 DIAGNOSIS — E78.49 OTHER HYPERLIPIDEMIA: ICD-10-CM

## 2023-04-19 LAB
ERYTHROCYTE [DISTWIDTH] IN BLOOD BY AUTOMATED COUNT: 43.8 FL (ref 35.9–50)
HCT VFR BLD AUTO: 43.3 % (ref 42–52)
HGB BLD-MCNC: 14.9 G/DL (ref 14–18)
MCH RBC QN AUTO: 30.8 PG (ref 27–33)
MCHC RBC AUTO-ENTMCNC: 34.4 G/DL (ref 33.7–35.3)
MCV RBC AUTO: 89.5 FL (ref 81.4–97.8)
PLATELET # BLD AUTO: 174 K/UL (ref 164–446)
PMV BLD AUTO: 9.3 FL (ref 9–12.9)
RBC # BLD AUTO: 4.84 M/UL (ref 4.7–6.1)
WBC # BLD AUTO: 4.5 K/UL (ref 4.8–10.8)

## 2023-04-19 PROCEDURE — 80061 LIPID PANEL: CPT

## 2023-04-19 PROCEDURE — 80053 COMPREHEN METABOLIC PANEL: CPT

## 2023-04-19 PROCEDURE — 85027 COMPLETE CBC AUTOMATED: CPT

## 2023-04-19 PROCEDURE — 36415 COLL VENOUS BLD VENIPUNCTURE: CPT

## 2023-04-20 LAB
ALBUMIN SERPL BCP-MCNC: 4.1 G/DL (ref 3.2–4.9)
ALBUMIN/GLOB SERPL: 1.6 G/DL
ALP SERPL-CCNC: 98 U/L (ref 30–99)
ALT SERPL-CCNC: 22 U/L (ref 2–50)
ANION GAP SERPL CALC-SCNC: 9 MMOL/L (ref 7–16)
AST SERPL-CCNC: 24 U/L (ref 12–45)
BILIRUB SERPL-MCNC: 0.3 MG/DL (ref 0.1–1.5)
BUN SERPL-MCNC: 20 MG/DL (ref 8–22)
CALCIUM ALBUM COR SERPL-MCNC: 9 MG/DL (ref 8.5–10.5)
CALCIUM SERPL-MCNC: 9.1 MG/DL (ref 8.5–10.5)
CHLORIDE SERPL-SCNC: 106 MMOL/L (ref 96–112)
CHOLEST SERPL-MCNC: 109 MG/DL (ref 100–199)
CO2 SERPL-SCNC: 26 MMOL/L (ref 20–33)
CREAT SERPL-MCNC: 0.91 MG/DL (ref 0.5–1.4)
FASTING STATUS PATIENT QL REPORTED: NORMAL
GFR SERPLBLD CREATININE-BSD FMLA CKD-EPI: 89 ML/MIN/1.73 M 2
GLOBULIN SER CALC-MCNC: 2.6 G/DL (ref 1.9–3.5)
GLUCOSE SERPL-MCNC: 92 MG/DL (ref 65–99)
HDLC SERPL-MCNC: 46 MG/DL
LDLC SERPL CALC-MCNC: 52 MG/DL
POTASSIUM SERPL-SCNC: 4.3 MMOL/L (ref 3.6–5.5)
PROT SERPL-MCNC: 6.7 G/DL (ref 6–8.2)
SODIUM SERPL-SCNC: 141 MMOL/L (ref 135–145)
TRIGL SERPL-MCNC: 53 MG/DL (ref 0–149)

## 2023-07-28 ENCOUNTER — OFFICE VISIT (OUTPATIENT)
Dept: CARDIOLOGY | Facility: MEDICAL CENTER | Age: 73
End: 2023-07-28
Attending: INTERNAL MEDICINE
Payer: MEDICARE

## 2023-07-28 VITALS
OXYGEN SATURATION: 96 % | BODY MASS INDEX: 25.62 KG/M2 | RESPIRATION RATE: 12 BRPM | DIASTOLIC BLOOD PRESSURE: 58 MMHG | WEIGHT: 183 LBS | SYSTOLIC BLOOD PRESSURE: 104 MMHG | HEIGHT: 71 IN | HEART RATE: 56 BPM

## 2023-07-28 DIAGNOSIS — I21.4 NSTEMI (NON-ST ELEVATED MYOCARDIAL INFARCTION) (HCC): ICD-10-CM

## 2023-07-28 DIAGNOSIS — I25.10 CORONARY ARTERY DISEASE INVOLVING NATIVE CORONARY ARTERY OF NATIVE HEART WITHOUT ANGINA PECTORIS: ICD-10-CM

## 2023-07-28 DIAGNOSIS — Z95.5 STENTED CORONARY ARTERY: ICD-10-CM

## 2023-07-28 DIAGNOSIS — E78.00 PURE HYPERCHOLESTEROLEMIA: ICD-10-CM

## 2023-07-28 DIAGNOSIS — Z78.9 STATIN INTOLERANCE: ICD-10-CM

## 2023-07-28 PROCEDURE — 99215 OFFICE O/P EST HI 40 MIN: CPT | Performed by: INTERNAL MEDICINE

## 2023-07-28 PROCEDURE — 3074F SYST BP LT 130 MM HG: CPT | Performed by: INTERNAL MEDICINE

## 2023-07-28 PROCEDURE — 99211 OFF/OP EST MAY X REQ PHY/QHP: CPT | Performed by: INTERNAL MEDICINE

## 2023-07-28 PROCEDURE — 3078F DIAST BP <80 MM HG: CPT | Performed by: INTERNAL MEDICINE

## 2023-07-28 RX ORDER — CLOPIDOGREL BISULFATE 75 MG/1
75 TABLET ORAL DAILY
Qty: 90 TABLET | Refills: 3 | Status: SHIPPED | OUTPATIENT
Start: 2023-07-28

## 2023-07-28 ASSESSMENT — ENCOUNTER SYMPTOMS
RESPIRATORY NEGATIVE: 1
ABDOMINAL PAIN: 0
VOMITING: 0
MUSCULOSKELETAL NEGATIVE: 1
CONSTITUTIONAL NEGATIVE: 1
FOCAL WEAKNESS: 0
WEAKNESS: 0
HEADACHES: 0
CHILLS: 0
DOUBLE VISION: 0
NERVOUS/ANXIOUS: 0
DEPRESSION: 0
EYES NEGATIVE: 1
MYALGIAS: 0
CARDIOVASCULAR NEGATIVE: 1
WEIGHT LOSS: 0
PALPITATIONS: 0
NEUROLOGICAL NEGATIVE: 1
BRUISES/BLEEDS EASILY: 0
BLURRED VISION: 0
FEVER: 0
GASTROINTESTINAL NEGATIVE: 1
NAUSEA: 0
DIZZINESS: 0
PSYCHIATRIC NEGATIVE: 1
COUGH: 0
CLAUDICATION: 0
SHORTNESS OF BREATH: 0

## 2023-07-28 ASSESSMENT — FIBROSIS 4 INDEX: FIB4 SCORE: 2.15

## 2023-07-28 NOTE — PROGRESS NOTES
Chief Complaint   Patient presents with    MI (Non ST Segment Elevation MI)     F/V Dx: NSTEMI (non-ST elevated myocardial infarction) (HCC)      Dyslipidemia    Coronary Artery Disease     F/V dx: Coronary artery disease involving native coronary artery of native heart without angina pectoris         Subjective     Flo Jhoan D R Lombard is a 72 y.o. male who presents today for follow up of coronary artery disease with prior stent placement.    Since the patient's last visit on 22 with Kumar Urrutia, he has been doing well clinically. He denies chest pain, shortness of breath, palpitations, nausea/vomiting or diaphoresis.     Past Medical History:   Diagnosis Date    CAD (coronary artery disease)     Hyperlipidemia      Past Surgical History:   Procedure Laterality Date    ANGIOPLASTY      ZZZ CARDIAC CATH       No family history on file.  Social History     Socioeconomic History    Marital status:      Spouse name: Not on file    Number of children: Not on file    Years of education: Not on file    Highest education level: Not on file   Occupational History    Not on file   Tobacco Use    Smoking status: Former     Types: Cigarettes     Quit date: 1980     Years since quittin.4    Smokeless tobacco: Never   Vaping Use    Vaping Use: Never used   Substance and Sexual Activity    Alcohol use: Yes     Comment: rarely     Drug use: Never    Sexual activity: Not on file   Other Topics Concern    Not on file   Social History Narrative    Not on file     Social Determinants of Health     Financial Resource Strain: Not on file   Food Insecurity: Not on file   Transportation Needs: Not on file   Physical Activity: Not on file   Stress: Not on file   Social Connections: Not on file   Intimate Partner Violence: Not on file   Housing Stability: Not on file     Allergies   Allergen Reactions    Repatha [Evolocumab] Swelling     Lip swelling    Crestor [Rosuvastatin]     Doxycycline  Unspecified     Queasy, malaise    Zetia [Ezetimibe]     Sulfamethoxazole-Trimethoprim Vomiting     nausea     (Medications reviewed.)   Outpatient Encounter Medications as of 7/28/2023   Medication Sig Dispense Refill    metoprolol SR (TOPROL XL) 25 MG TABLET SR 24 HR Take 1 Tablet by mouth every evening. 90 Tablet 3    prasugrel (EFFIENT) 10 MG Tab Take 1 Tablet by mouth every evening. (Patient taking differently: Take 5 mg by mouth every evening.) 90 Tablet 3    LEQVIO 284 MG/1.5ML injection       tamsulosin (FLOMAX) 0.4 MG capsule Take 0.4 mg by mouth every day.      Cyanocobalamin (VITAMIN B-12) 1000 MCG Tab Take 1,000 mcg by mouth 2 times a day.      aspirin EC (ECOTRIN) 81 MG Tablet Delayed Response Take 81 mg by mouth every day.      rosuvastatin (CRESTOR) 10 MG Tab Take 1 Tablet by mouth every evening. 90 Tablet 3     No facility-administered encounter medications on file as of 7/28/2023.     Review of Systems   Constitutional: Negative.  Negative for chills, fever, malaise/fatigue and weight loss.   HENT: Negative.  Negative for hearing loss.    Eyes: Negative.  Negative for blurred vision and double vision.   Respiratory: Negative.  Negative for cough and shortness of breath.    Cardiovascular: Negative.  Negative for chest pain, palpitations, claudication and leg swelling.   Gastrointestinal: Negative.  Negative for abdominal pain, nausea and vomiting.   Genitourinary: Negative.  Negative for dysuria and urgency.   Musculoskeletal: Negative.  Negative for joint pain and myalgias.   Skin: Negative.  Negative for itching and rash.   Neurological: Negative.  Negative for dizziness, focal weakness, weakness and headaches.   Endo/Heme/Allergies: Negative.  Does not bruise/bleed easily.   Psychiatric/Behavioral: Negative.  Negative for depression. The patient is not nervous/anxious.               Objective     /58 (BP Location: Left arm, Patient Position: Sitting, BP Cuff Size: Adult)   Pulse (!) 56    "Resp 12   Ht 1.803 m (5' 11\")   Wt 83 kg (183 lb)   SpO2 96%   BMI 25.52 kg/m²     Physical Exam  Constitutional:       Appearance: Normal appearance. He is well-developed and normal weight.   HENT:      Head: Normocephalic and atraumatic.   Neck:      Vascular: No JVD.   Cardiovascular:      Rate and Rhythm: Normal rate and regular rhythm.      Heart sounds: Normal heart sounds.   Pulmonary:      Effort: Pulmonary effort is normal.      Breath sounds: Normal breath sounds.   Abdominal:      General: Bowel sounds are normal.      Palpations: Abdomen is soft.      Comments: No hepatosplenomegaly.   Musculoskeletal:         General: Normal range of motion.   Lymphadenopathy:      Cervical: No cervical adenopathy.   Skin:     General: Skin is warm and dry.   Neurological:      Mental Status: He is alert and oriented to person, place, and time.            CARDIAC STUDIES/PROCEDURES:    CARDIAC CATHETERIZATION CONCLUSIONS by Kumar Urrutia (02/13/22)  Placement of a 3.5 by 22mm [place] drug-eluting stent in proximal  left anterior descending coronary artery.  (study result reviewed)     ECHOCARDIOGRAM CONCLUSIONS (02/13/22)  No prior study is available for comparison.   The left ventricular ejection fraction is visually estimated to be 70%.  Normal right ventricular size and systolic function.  Right ventricular systolic pressure is estimated to be 28 mmHg  (study result reviewed)     EKG performed on (02/13/22) was reviewed: EKG personally interpreted shows sinus bradycardia with non-specific T wave abnormalities.     Laboratory results of (04/19/23) were reviewed. Cholesterol profile of 109/53/46/52 mg/dL noted.     Assessment & Plan     1. Coronary artery disease involving native coronary artery of native heart without angina pectoris        2. Stented coronary artery        3. Pure hypercholesterolemia            Medical Decision Making: Today's Assessment/Status/Plan:        Coronary artery disease with " stent placement: He is clinically doing well without recurrence of his angina. We will continue with current medical care including metoprolol. We will discontinue aspirin, prasugrel and start clopidogrel.  Hyperlipidemia with intolerance to statin therapy treated with PCSK9 therapy. (Managed by Dr. Vivas)    Greater than 45 minutes of time was spent to review all above information; of which more than 50% of the time was face to face reviewing thee patient's medical issues, medication evaluation, study result review, lab results review prior cardiac records.    We will follow up in 6 months with Kumar Urrutia as requested by the patient.     CC Brando Bray

## 2023-09-17 DIAGNOSIS — I25.10 CORONARY ARTERY DISEASE INVOLVING NATIVE CORONARY ARTERY OF NATIVE HEART WITHOUT ANGINA PECTORIS: ICD-10-CM

## 2023-09-18 RX ORDER — METOPROLOL SUCCINATE 25 MG/1
25 TABLET, EXTENDED RELEASE ORAL EVERY EVENING
Qty: 90 TABLET | Refills: 1 | Status: SHIPPED | OUTPATIENT
Start: 2023-09-18 | End: 2024-03-19 | Stop reason: SDUPTHER

## 2023-09-18 NOTE — TELEPHONE ENCOUNTER
Is the patient due for a refill? Yes    Was the patient seen the past year? Yes    Date of last office visit: 7/28/23    Does the patient have an upcoming appointment?  No      Provider to refill:JI    Does the patients insurance require a 100 day supply?  No

## 2023-10-04 ENCOUNTER — HOSPITAL ENCOUNTER (OUTPATIENT)
Dept: LAB | Facility: MEDICAL CENTER | Age: 73
End: 2023-10-04
Attending: INTERNAL MEDICINE
Payer: MEDICARE

## 2023-10-04 DIAGNOSIS — E78.00 PURE HYPERCHOLESTEROLEMIA: ICD-10-CM

## 2023-10-04 LAB
ALBUMIN SERPL BCP-MCNC: 4 G/DL (ref 3.2–4.9)
ALBUMIN/GLOB SERPL: 1.5 G/DL
ALP SERPL-CCNC: 98 U/L (ref 30–99)
ALT SERPL-CCNC: 15 U/L (ref 2–50)
ANION GAP SERPL CALC-SCNC: 9 MMOL/L (ref 7–16)
AST SERPL-CCNC: 20 U/L (ref 12–45)
BILIRUB SERPL-MCNC: 0.4 MG/DL (ref 0.1–1.5)
BUN SERPL-MCNC: 20 MG/DL (ref 8–22)
CALCIUM ALBUM COR SERPL-MCNC: 9 MG/DL (ref 8.5–10.5)
CALCIUM SERPL-MCNC: 9 MG/DL (ref 8.5–10.5)
CHLORIDE SERPL-SCNC: 106 MMOL/L (ref 96–112)
CHOLEST SERPL-MCNC: 122 MG/DL (ref 100–199)
CO2 SERPL-SCNC: 26 MMOL/L (ref 20–33)
CREAT SERPL-MCNC: 0.98 MG/DL (ref 0.5–1.4)
FASTING STATUS PATIENT QL REPORTED: NORMAL
GFR SERPLBLD CREATININE-BSD FMLA CKD-EPI: 81 ML/MIN/1.73 M 2
GLOBULIN SER CALC-MCNC: 2.6 G/DL (ref 1.9–3.5)
GLUCOSE SERPL-MCNC: 86 MG/DL (ref 65–99)
HDLC SERPL-MCNC: 40 MG/DL
LDLC SERPL CALC-MCNC: 67 MG/DL
POTASSIUM SERPL-SCNC: 4.2 MMOL/L (ref 3.6–5.5)
PROT SERPL-MCNC: 6.6 G/DL (ref 6–8.2)
SODIUM SERPL-SCNC: 141 MMOL/L (ref 135–145)
TRIGL SERPL-MCNC: 74 MG/DL (ref 0–149)

## 2023-10-04 PROCEDURE — 80061 LIPID PANEL: CPT

## 2023-10-04 PROCEDURE — 80053 COMPREHEN METABOLIC PANEL: CPT

## 2023-10-04 PROCEDURE — 36415 COLL VENOUS BLD VENIPUNCTURE: CPT

## 2024-03-19 DIAGNOSIS — I25.10 CORONARY ARTERY DISEASE INVOLVING NATIVE CORONARY ARTERY OF NATIVE HEART WITHOUT ANGINA PECTORIS: ICD-10-CM

## 2024-03-22 RX ORDER — METOPROLOL SUCCINATE 25 MG/1
25 TABLET, EXTENDED RELEASE ORAL EVERY EVENING
Qty: 90 TABLET | Refills: 1 | Status: SHIPPED | OUTPATIENT
Start: 2024-03-22

## 2024-05-29 ENCOUNTER — TELEMEDICINE (OUTPATIENT)
Dept: CARDIOLOGY | Facility: MEDICAL CENTER | Age: 74
End: 2024-05-29
Attending: INTERNAL MEDICINE
Payer: MEDICARE

## 2024-05-29 VITALS
HEART RATE: 60 BPM | WEIGHT: 176 LBS | BODY MASS INDEX: 24.55 KG/M2 | DIASTOLIC BLOOD PRESSURE: 70 MMHG | SYSTOLIC BLOOD PRESSURE: 115 MMHG

## 2024-05-29 DIAGNOSIS — E78.00 PURE HYPERCHOLESTEROLEMIA: ICD-10-CM

## 2024-05-29 DIAGNOSIS — Z95.5 STENTED CORONARY ARTERY: ICD-10-CM

## 2024-05-29 DIAGNOSIS — I25.10 CORONARY ARTERY DISEASE INVOLVING NATIVE CORONARY ARTERY OF NATIVE HEART WITHOUT ANGINA PECTORIS: ICD-10-CM

## 2024-05-29 DIAGNOSIS — I49.3 PVC (PREMATURE VENTRICULAR CONTRACTION): ICD-10-CM

## 2024-05-29 ASSESSMENT — FIBROSIS 4 INDEX: FIB4 SCORE: 2.2

## 2024-05-29 NOTE — PROGRESS NOTES
Cardiology Follow-up Consultation Note-virtual visit      Visit was done through Zoom vicente.  Patient identity was confirmed, verbal consent was obtained.  Patient is a resident of [place], located at his house        CC: Follow-up coronary artery disease, dyslipidemia    Patient ID/HPI:   71-year-old male patient with dyslipidemia, coronary artery disease s/p PCI of LAD here for follow-up.  Since last evaluated by me he had hip fracture, subsequently underwent surgery, recovering well.  He has significant PVCs but they are improved feels well currently, able to bike, be active.    Past Medical History:   Diagnosis Date    CAD (coronary artery disease)     Hyperlipidemia          Current Outpatient Medications   Medication Sig Dispense Refill    clopidogrel (PLAVIX) 75 MG Tab Take 1 Tablet by mouth every day. 90 Tablet 3    LEQVIO 284 MG/1.5ML injection       Cyanocobalamin (VITAMIN B-12) 1000 MCG Tab Take 1,000 mcg by mouth 2 times a day.       No current facility-administered medications for this visit.         Physical Exam:  Ambulatory Vitals  /70 (BP Location: Right arm, Patient Position: Sitting)   Pulse 60   Wt 79.8 kg (176 lb)    Oxygen Therapy:     BP Readings from Last 4 Encounters:   05/29/24 115/70   07/28/23 104/58   10/18/22 118/60   03/07/22 102/52       Weight/BMI: Body mass index is 24.55 kg/m².  Wt Readings from Last 4 Encounters:   05/29/24 79.8 kg (176 lb)   07/28/23 83 kg (183 lb)   10/18/22 81.2 kg (179 lb)   03/07/22 88 kg (194 lb)       General: Well appearing and in no apparent distress  Neck: JVP absent, carotid bruits absent  Lungs: respiratory sounds  normal, additional breath sounds absent  Heart: Regular rhythm,   No palpable thrills on palpation, murmurs absent, no rubs,   Lower extremity edema absent.     Recent LDL reviewed, less than 70      Impression and Plan:  73-year-old male patient with PVCs ,dyslipidemia, coronary artery disease status post PCI of left anterior  descending artery here for follow-up.  He is doing well from cardiac standpoint.  Baseline heart rate is low, not taking metoprolol anymore.  Continue Plavix, okay to hold temporarily for planned hand surgery.  Continue Leqvio.  PVCs improved    Return in about 1 year (around 5/29/2025).      Kumar CASTILLO  Interventional cardiologist  [facility], [facility]  [address] [address]  Phone: [phone]  Fax: [phone]

## 2024-06-17 PROBLEM — M72.0 DUPUYTREN'S CONTRACTURE: Status: ACTIVE | Noted: 2024-06-17

## 2024-11-01 NOTE — PROGRESS NOTES
[facility] Medicine Daily Progress Note    Date of Service  2/13/2022    Chief Complaint  John Timothy D R Lombard is a 71 y.o. male admitted 2/12/2022 with sudden left-sided chest pressure    Hospital Course  71-year-old male with past medical history of BPH presented 2/12/2022 with chest pain.  Please refer to H&P of Dr. 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      He had previous CT angiogram with an LAD plaque, however with low coronary calcium score. he last had  echocardiogram several years ago, and everything within normal limits.  Never had cardiac catheterization before.  On arrival troponin 72, EKG sinus rhythm without bleeding wave changes.  Chest x-ray no acute pulmonary abnormalities.  Cardiology was consulted.  Patient was taken for cardiac cath by Dr. Palomares. Stent placement       Interval Problem Update  Seen post cath. Feeling little tired, but well so far. Asking to take crestor 10 mg while inpatient. Pt does not do well with statin overall, but he is willing to take it while inpatient.     I have personally seen and examined the patient at bedside. I discussed the plan of care with patient and bedside RN.    Consultants/Specialty  cardiology    Code Status  Full Code    Disposition  Patient is not medically cleared for discharge.   Anticipate discharge to to home with close outpatient follow-up.  I have placed the appropriate orders for post-discharge needs.    Review of Systems  Review of Systems   Constitutional: Positive for malaise/fatigue. Negative for chills and fever.   HENT: Negative for sore throat.    Respiratory: Negative for cough and shortness of breath.    Cardiovascular: Negative for chest pain, palpitations and leg swelling.   Gastrointestinal: Negative for abdominal pain, constipation, diarrhea, nausea and vomiting.   Genitourinary: Negative for dysuria and urgency.   Musculoskeletal: Negative  for back pain.   Neurological: Negative for dizziness and headaches.   Psychiatric/Behavioral: The patient is not nervous/anxious.         Physical Exam  Temp:  [36.2 °C (97.1 °F)-36.7 °C (98 °F)] 36.6 °C (97.9 °F)  Pulse:  [51-99] 65  Resp:  [0-65] 20  BP: ()/(50-87) 126/60  SpO2:  [89 %-100 %] 97 %    Physical Exam  Vitals and nursing note reviewed.   Constitutional:       Appearance: He is not ill-appearing.   HENT:      Head: Normocephalic and atraumatic.   Eyes:      General: No scleral icterus.  Cardiovascular:      Rate and Rhythm: Normal rate.      Heart sounds: No murmur heard.      Pulmonary:      Effort: Pulmonary effort is normal. No respiratory distress.      Breath sounds: No wheezing or rales.   Abdominal:      General: There is no distension.      Palpations: Abdomen is soft.      Tenderness: There is no abdominal tenderness. There is no guarding.   Musculoskeletal:         General: No swelling. Normal range of motion.   Skin:     General: Skin is dry.      Coloration: Skin is not pale.   Neurological:      Mental Status: He is alert and oriented to person, place, and time.   Psychiatric:         Mood and Affect: Mood normal.         Behavior: Behavior normal.         Thought Content: Thought content normal.         Judgment: Judgment normal.         Fluids    Intake/Output Summary (Last 24 hours) at 2/13/2022 1651  Last data filed at 2/13/2022 1353  Gross per 24 hour   Intake 200 ml   Output 450 ml   Net -250 ml       Laboratory  Recent Labs     02/12/22  1851   WBC 5.1   RBC 4.83   HEMOGLOBIN 15.2   HEMATOCRIT 44.9   MCV 93.0   MCH 31.5   MCHC 33.9   RDW 45.3   PLATELETCT 201   MPV 9.4     Recent Labs     02/12/22  1851   SODIUM 142   POTASSIUM 4.3   CHLORIDE 106   CO2 23   GLUCOSE 101*   BUN 18   CREATININE 0.96   CALCIUM 9.6             Recent Labs     02/12/22  2046   TRIGLYCERIDE 86   HDL 46   LDL 86       Imaging  EC-ECHOCARDIOGRAM COMPLETE W/O CONT   Final Result      DX-CHEST-PORTABLE  (1 VIEW)   Final Result         1. No acute cardiopulmonary abnormalities are identified.      CL-LEFT HEART CATHETERIZATION WITH POSSIBLE INTERVENTION    (Results Pending)        Assessment/Plan  * NSTEMI (non-ST elevated myocardial infarction) (HCC)- (present on admission)  Assessment & Plan  Admit patient to telemetry  Cardiology consulted, will go for cath in a.m.  TTE in AM  Troponin 72, deferred collection of serial troponins  Self administered loading dose of ASA today, and took 1 Crestor  Lipid panel, A1c    B12 deficiency  Assessment & Plan  Continue B12    BPH (benign prostatic hyperplasia)  Assessment & Plan  Continue flomax       VTE prophylaxis: heparin ppx    I have performed a physical exam and reviewed and updated ROS and Plan today (2/13/2022). In review of yesterday's note (2/12/2022), there are no changes except as documented above.       oral

## 2025-08-18 DIAGNOSIS — I25.10 CORONARY ARTERY DISEASE INVOLVING NATIVE CORONARY ARTERY OF NATIVE HEART WITHOUT ANGINA PECTORIS: ICD-10-CM
